# Patient Record
Sex: FEMALE | Race: WHITE | Employment: OTHER | URBAN - METROPOLITAN AREA
[De-identification: names, ages, dates, MRNs, and addresses within clinical notes are randomized per-mention and may not be internally consistent; named-entity substitution may affect disease eponyms.]

---

## 2017-02-15 PROCEDURE — 82570 ASSAY OF URINE CREATININE: CPT | Performed by: INTERNAL MEDICINE

## 2017-02-15 PROCEDURE — 82043 UR ALBUMIN QUANTITATIVE: CPT | Performed by: INTERNAL MEDICINE

## 2017-03-30 ENCOUNTER — APPOINTMENT (OUTPATIENT)
Dept: LAB | Age: 67
End: 2017-03-30
Attending: INTERNAL MEDICINE
Payer: COMMERCIAL

## 2017-03-30 DIAGNOSIS — D75.839 THROMBOCYTOSIS: ICD-10-CM

## 2017-03-30 DIAGNOSIS — R79.89 ABNORMAL CBC: ICD-10-CM

## 2017-03-30 PROCEDURE — 36415 COLL VENOUS BLD VENIPUNCTURE: CPT

## 2017-03-30 PROCEDURE — 81206 BCR/ABL1 GENE MAJOR BP: CPT

## 2017-03-30 PROCEDURE — 81270 JAK2 GENE: CPT

## 2017-04-19 ENCOUNTER — LABORATORY ENCOUNTER (OUTPATIENT)
Dept: LAB | Age: 67
End: 2017-04-19
Attending: INTERNAL MEDICINE
Payer: COMMERCIAL

## 2017-04-19 DIAGNOSIS — D75.839 THROMBOCYTOSIS: ICD-10-CM

## 2017-04-19 DIAGNOSIS — R79.89 ABNORMAL CBC: ICD-10-CM

## 2017-04-19 DIAGNOSIS — D47.1 MYELOPROLIFERATIVE DISEASE (HCC): ICD-10-CM

## 2017-04-19 PROCEDURE — 88342 IMHCHEM/IMCYTCHM 1ST ANTB: CPT

## 2017-04-19 PROCEDURE — 88305 TISSUE EXAM BY PATHOLOGIST: CPT

## 2017-04-19 PROCEDURE — 88311 DECALCIFY TISSUE: CPT

## 2017-04-19 PROCEDURE — 88271 CYTOGENETICS DNA PROBE: CPT

## 2017-04-19 PROCEDURE — 88275 CYTOGENETICS 100-300: CPT

## 2017-04-19 PROCEDURE — 88264 CHROMOSOME ANALYSIS 20-25: CPT

## 2017-04-19 PROCEDURE — 85097 BONE MARROW INTERPRETATION: CPT

## 2017-04-19 PROCEDURE — 88237 TISSUE CULTURE BONE MARROW: CPT

## 2017-04-19 PROCEDURE — 88313 SPECIAL STAINS GROUP 2: CPT

## 2017-05-03 PROCEDURE — 82668 ASSAY OF ERYTHROPOIETIN: CPT | Performed by: INTERNAL MEDICINE

## 2017-05-17 PROBLEM — D47.1 MYELOPROLIFERATIVE DISORDER (HCC): Status: ACTIVE | Noted: 2017-05-17

## 2017-05-31 PROBLEM — Z79.899 ENCOUNTER FOR MONITORING OF HYDROXYUREA THERAPY: Status: ACTIVE | Noted: 2017-05-31

## 2017-05-31 PROBLEM — Z51.81 ENCOUNTER FOR MONITORING OF HYDROXYUREA THERAPY: Status: ACTIVE | Noted: 2017-05-31

## 2017-07-14 PROBLEM — Z51.81 ENCOUNTER FOR MONITORING OF HYDROXYUREA THERAPY: Status: RESOLVED | Noted: 2017-05-31 | Resolved: 2017-07-14

## 2017-07-14 PROBLEM — Z79.899 ENCOUNTER FOR MONITORING OF HYDROXYUREA THERAPY: Status: RESOLVED | Noted: 2017-05-31 | Resolved: 2017-07-14

## 2017-07-14 PROCEDURE — 81001 URINALYSIS AUTO W/SCOPE: CPT | Performed by: INTERNAL MEDICINE

## 2017-07-14 PROCEDURE — 87086 URINE CULTURE/COLONY COUNT: CPT | Performed by: INTERNAL MEDICINE

## 2017-09-08 PROBLEM — K21.9 GASTROESOPHAGEAL REFLUX DISEASE, ESOPHAGITIS PRESENCE NOT SPECIFIED: Status: ACTIVE | Noted: 2017-09-08

## 2017-09-08 PROBLEM — K59.09 OTHER CONSTIPATION: Status: ACTIVE | Noted: 2017-09-08

## 2017-09-08 PROBLEM — R10.30 LOWER ABDOMINAL PAIN: Status: ACTIVE | Noted: 2017-09-08

## 2017-09-08 PROBLEM — R19.8 RECTAL PRESSURE: Status: ACTIVE | Noted: 2017-09-08

## 2017-12-12 PROCEDURE — 88305 TISSUE EXAM BY PATHOLOGIST: CPT | Performed by: SPECIALIST

## 2017-12-12 PROCEDURE — 88342 IMHCHEM/IMCYTCHM 1ST ANTB: CPT | Performed by: SPECIALIST

## 2018-01-02 RX ORDER — SAW/VIT E/SOD SEL/LYC/BETA/PYG 160-100
1 TABLET ORAL DAILY
COMMUNITY
End: 2018-05-07 | Stop reason: ALTCHOICE

## 2018-01-08 ENCOUNTER — ANESTHESIA EVENT (OUTPATIENT)
Dept: ENDOSCOPY | Facility: HOSPITAL | Age: 68
End: 2018-01-08
Payer: MEDICARE

## 2018-01-09 ENCOUNTER — HOSPITAL ENCOUNTER (OUTPATIENT)
Facility: HOSPITAL | Age: 68
Setting detail: HOSPITAL OUTPATIENT SURGERY
Discharge: HOME | End: 2018-01-09
Attending: INTERNAL MEDICINE | Admitting: INTERNAL MEDICINE
Payer: MEDICARE

## 2018-01-09 ENCOUNTER — ANESTHESIA (OUTPATIENT)
Dept: ENDOSCOPY | Facility: HOSPITAL | Age: 68
End: 2018-01-09
Payer: MEDICARE

## 2018-01-09 ENCOUNTER — SURGERY (OUTPATIENT)
Age: 68
End: 2018-01-09

## 2018-01-09 VITALS
DIASTOLIC BLOOD PRESSURE: 85 MMHG | RESPIRATION RATE: 46 BRPM | SYSTOLIC BLOOD PRESSURE: 139 MMHG | TEMPERATURE: 98 F | HEART RATE: 73 BPM | WEIGHT: 185 LBS | HEIGHT: 65 IN | BODY MASS INDEX: 30.82 KG/M2 | OXYGEN SATURATION: 100 %

## 2018-01-09 DIAGNOSIS — R59.1 LYMPHADENOPATHY: ICD-10-CM

## 2018-01-09 DIAGNOSIS — K62.89 RECTAL MASS: ICD-10-CM

## 2018-01-09 LAB — GLUCOSE BLD-MCNC: 93 MG/DL (ref 65–99)

## 2018-01-09 PROCEDURE — 82962 GLUCOSE BLOOD TEST: CPT

## 2018-01-09 PROCEDURE — 0DBP8ZX EXCISION OF RECTUM, VIA NATURAL OR ARTIFICIAL OPENING ENDOSCOPIC, DIAGNOSTIC: ICD-10-PCS | Performed by: INTERNAL MEDICINE

## 2018-01-09 PROCEDURE — 0DJD8ZZ INSPECTION OF LOWER INTESTINAL TRACT, VIA NATURAL OR ARTIFICIAL OPENING ENDOSCOPIC: ICD-10-PCS | Performed by: INTERNAL MEDICINE

## 2018-01-09 PROCEDURE — 88305 TISSUE EXAM BY PATHOLOGIST: CPT | Performed by: INTERNAL MEDICINE

## 2018-01-09 RX ORDER — SODIUM CHLORIDE, SODIUM LACTATE, POTASSIUM CHLORIDE, CALCIUM CHLORIDE 600; 310; 30; 20 MG/100ML; MG/100ML; MG/100ML; MG/100ML
INJECTION, SOLUTION INTRAVENOUS CONTINUOUS
Status: DISCONTINUED | OUTPATIENT
Start: 2018-01-09 | End: 2018-01-09

## 2018-01-09 RX ORDER — DEXTROSE MONOHYDRATE 25 G/50ML
50 INJECTION, SOLUTION INTRAVENOUS
Status: DISCONTINUED | OUTPATIENT
Start: 2018-01-09 | End: 2018-01-09

## 2018-01-09 RX ORDER — NALOXONE HYDROCHLORIDE 0.4 MG/ML
80 INJECTION, SOLUTION INTRAMUSCULAR; INTRAVENOUS; SUBCUTANEOUS AS NEEDED
Status: DISCONTINUED | OUTPATIENT
Start: 2018-01-09 | End: 2018-01-09

## 2018-01-09 RX ORDER — ONDANSETRON 2 MG/ML
4 INJECTION INTRAMUSCULAR; INTRAVENOUS AS NEEDED
Status: CANCELLED | OUTPATIENT
Start: 2018-01-09 | End: 2018-01-10

## 2018-01-09 RX ORDER — SODIUM CHLORIDE, SODIUM LACTATE, POTASSIUM CHLORIDE, CALCIUM CHLORIDE 600; 310; 30; 20 MG/100ML; MG/100ML; MG/100ML; MG/100ML
INJECTION, SOLUTION INTRAVENOUS CONTINUOUS
Status: CANCELLED | OUTPATIENT
Start: 2018-01-09

## 2018-01-09 RX ORDER — HYDROMORPHONE HYDROCHLORIDE 1 MG/ML
0.4 INJECTION, SOLUTION INTRAMUSCULAR; INTRAVENOUS; SUBCUTANEOUS EVERY 5 MIN PRN
Status: DISCONTINUED | OUTPATIENT
Start: 2018-01-09 | End: 2018-01-09

## 2018-01-09 RX ORDER — ONDANSETRON 2 MG/ML
4 INJECTION INTRAMUSCULAR; INTRAVENOUS AS NEEDED
Status: DISCONTINUED | OUTPATIENT
Start: 2018-01-09 | End: 2018-01-09

## 2018-01-09 RX ORDER — DEXTROSE MONOHYDRATE 25 G/50ML
50 INJECTION, SOLUTION INTRAVENOUS
Status: CANCELLED | OUTPATIENT
Start: 2018-01-09

## 2018-01-09 RX ORDER — HYDROMORPHONE HYDROCHLORIDE 1 MG/ML
0.4 INJECTION, SOLUTION INTRAMUSCULAR; INTRAVENOUS; SUBCUTANEOUS EVERY 5 MIN PRN
Status: CANCELLED | OUTPATIENT
Start: 2018-01-09 | End: 2018-01-10

## 2018-01-09 RX ORDER — NALOXONE HYDROCHLORIDE 0.4 MG/ML
80 INJECTION, SOLUTION INTRAMUSCULAR; INTRAVENOUS; SUBCUTANEOUS AS NEEDED
Status: CANCELLED | OUTPATIENT
Start: 2018-01-09 | End: 2018-01-10

## 2018-01-09 NOTE — ANESTHESIA POSTPROCEDURE EVALUATION
25 [facility] Patient Status:  Hospital Outpatient Surgery   Age/Gender 74year old female MRN [id number]   Location 118 [facility]. Attending Lexx Meza MD   Hosp Day # 0 PCP Dinesh Mehta, DO       Anesthesia Post-op Not

## 2018-01-09 NOTE — H&P
[facility] Department of  Gastroenterology  Update Health History :       Emmalene Hamper  female   Jose Nash MD     [id number]  6/23/1950 Primary Care Physician  Naheed Oakley DO        HPI :  Rectal mass, bx HGD in [place],but CT showed mas and Heart Disorder Father    • Hypertension Father    • Cancer Mother      breast   • Breast Cancer Mother 47   • Diabetes Paternal Grandmother    • Lipids Neg       Smoking status: Former Smoker                                                              Pac (ADVIL) 200 MG Oral Cap Take 4 capsules by mouth as needed. Disp:  Rfl:    aspirin (SB LOW DOSE ASA EC) 81 MG Oral Tab EC Take 81 mg by mouth daily. Disp:  Rfl:    Ascorbic Acid (JENNIFER-C OR) Take 500 mg by mouth daily.  1 daily  Disp:  Rfl:    Calcium 500 MG

## 2018-01-09 NOTE — ANESTHESIA PREPROCEDURE EVALUATION
PRE-OP EVALUATION    Patient Name: Geeta Peñaloza    Pre-op Diagnosis: Lymphadenopathy [R59.1]  Rectal mass [K62.9]    Procedure(s):  RECTAL ENDOSCOPIC ULTRASOUND, FLEXIBLE SIGMOIDOSCOPY WITH FINE NEEDLE ASPIRATION  _    Surgeon(s) and Role:     * Piter tolerance: good     MET: >4    (+) obesity  (+) hypertension   (+) hyperlipidemia                                  Endo/Other      (+) diabetes  type 2, using insulin  (+) hypothyroidism                       Pulmonary    Negative pulmonary ROS. pre-op labs reviewed.     Lab Results  Component Value Date   WBC 8.18 12/07/2017   RBC 3.86 12/07/2017   HGB 14.5 12/07/2017   HCT 41.7 12/07/2017   .0 (H) 12/07/2017   MCH 37.6 (H) 12/07/2017   MCHC 34.8 12/07/2017   RDW 13.0 12/07/2017    1

## 2018-01-10 NOTE — OPERATIVE REPORT
[facility]    PATIENT'S NAME: Rob Adventism   ATTENDING PHYSICIAN: Aaron Hall M.D. OPERATING PHYSICIAN: Aaron Hall M.D.    PATIENT ACCOUNT#:   [de-identified]    LOCATION:  [address]  MEDICAL RECORD #:   [id number]       SWATI removed. Next, the radial electronic echoendoscope was introduced under visualization in the rectum. The mass itself was visualized and appeared to be hypoechoic invading into the serosa, making this a T3 lesion.   There were at least 3 lymph nodes noted,

## 2018-01-12 PROBLEM — C20 RECTAL CANCER (HCC): Status: ACTIVE | Noted: 2018-01-12

## 2018-01-23 PROCEDURE — 82378 CARCINOEMBRYONIC ANTIGEN: CPT | Performed by: INTERNAL MEDICINE

## 2018-03-13 PROCEDURE — 82272 OCCULT BLD FECES 1-3 TESTS: CPT | Performed by: RADIOLOGY

## 2018-04-06 PROBLEM — R19.8 RECTAL PRESSURE: Status: RESOLVED | Noted: 2017-09-08 | Resolved: 2018-04-06

## 2018-04-06 PROBLEM — R10.30 LOWER ABDOMINAL PAIN: Status: RESOLVED | Noted: 2017-09-08 | Resolved: 2018-04-06

## 2018-05-05 PROBLEM — I70.8 AORTO-ILIAC ATHEROSCLEROSIS (HCC): Status: ACTIVE | Noted: 2018-05-05

## 2018-05-05 PROBLEM — I77.1 TORTUOUS AORTA (HCC): Status: ACTIVE | Noted: 2018-05-05

## 2018-05-05 PROBLEM — J84.9 INTERSTITIAL LUNG DISEASE (HCC): Status: ACTIVE | Noted: 2018-05-05

## 2018-05-05 PROBLEM — J43.2 CENTRILOBULAR EMPHYSEMA (HCC): Status: ACTIVE | Noted: 2018-05-05

## 2018-05-05 PROBLEM — I70.0 AORTO-ILIAC ATHEROSCLEROSIS (HCC): Status: ACTIVE | Noted: 2018-05-05

## 2018-05-05 PROBLEM — I70.0 AORTIC ATHEROSCLEROSIS (HCC): Status: ACTIVE | Noted: 2018-05-05

## 2018-07-16 PROBLEM — N28.9 RENAL INSUFFICIENCY: Status: ACTIVE | Noted: 2018-07-16

## 2018-07-16 PROBLEM — D64.81 ANEMIA ASSOCIATED WITH CHEMOTHERAPY: Status: ACTIVE | Noted: 2018-07-16

## 2018-07-16 PROBLEM — T45.1X5A ANEMIA ASSOCIATED WITH CHEMOTHERAPY: Status: ACTIVE | Noted: 2018-07-16

## 2018-07-30 PROCEDURE — 82607 VITAMIN B-12: CPT | Performed by: PHYSICIAN ASSISTANT

## 2018-08-27 PROCEDURE — 87493 C DIFF AMPLIFIED PROBE: CPT | Performed by: NURSE PRACTITIONER

## 2018-09-04 PROBLEM — N18.30 CKD (CHRONIC KIDNEY DISEASE) STAGE 3, GFR 30-59 ML/MIN (HCC): Status: ACTIVE | Noted: 2018-09-04

## 2018-10-17 PROBLEM — E83.42 HYPOMAGNESEMIA: Status: ACTIVE | Noted: 2018-10-17

## 2018-11-05 PROBLEM — I50.9 HEART FAILURE, UNSPECIFIED HF CHRONICITY, UNSPECIFIED HEART FAILURE TYPE (HCC): Status: ACTIVE | Noted: 2018-11-05

## 2018-11-05 PROBLEM — I50.9 HEART FAILURE, UNSPECIFIED HF CHRONICITY, UNSPECIFIED HEART FAILURE TYPE (HCC): Status: RESOLVED | Noted: 2018-11-05 | Resolved: 2018-11-05

## 2018-11-14 PROBLEM — R91.8 LUNG NODULE, MULTIPLE: Status: ACTIVE | Noted: 2018-11-14

## 2019-01-03 PROBLEM — Z93.3 COLOSTOMY IN PLACE (HCC): Status: ACTIVE | Noted: 2019-01-03

## 2019-01-03 PROBLEM — D70.1 CHEMOTHERAPY-INDUCED NEUTROPENIA (HCC): Status: ACTIVE | Noted: 2019-01-03

## 2019-01-03 PROBLEM — T45.1X5A CHEMOTHERAPY-INDUCED NEUTROPENIA (HCC): Status: ACTIVE | Noted: 2019-01-03

## 2019-01-29 ENCOUNTER — HOSPITAL ENCOUNTER (INPATIENT)
Facility: HOSPITAL | Age: 69
LOS: 9 days | Discharge: HOME | DRG: 074 | End: 2019-02-07
Attending: INTERNAL MEDICINE | Admitting: INTERNAL MEDICINE
Payer: MEDICARE

## 2019-01-29 DIAGNOSIS — R11.0 NAUSEA: ICD-10-CM

## 2019-01-29 PROBLEM — N17.9 AKI (ACUTE KIDNEY INJURY) (HCC): Status: ACTIVE | Noted: 2019-01-29

## 2019-01-29 LAB
ANION GAP SERPL CALC-SCNC: 12 MMOL/L (ref 0–18)
BASOPHILS # BLD AUTO: 0.04 X10(3) UL (ref 0–0.2)
BASOPHILS # BLD AUTO: 0.05 X10(3) UL (ref 0–0.2)
BASOPHILS NFR BLD AUTO: 0.4 %
BASOPHILS NFR BLD AUTO: 0.4 %
BILIRUB UR QL STRIP.AUTO: NEGATIVE
BUN BLD-MCNC: 55 MG/DL (ref 8–20)
BUN/CREAT SERPL: 25 (ref 10–20)
CALCIUM BLD-MCNC: 9.5 MG/DL (ref 8.3–10.3)
CHLORIDE SERPL-SCNC: 99 MMOL/L (ref 101–111)
CO2 SERPL-SCNC: 19 MMOL/L (ref 22–32)
COLOR UR AUTO: YELLOW
CREAT BLD-MCNC: 2.2 MG/DL (ref 0.55–1.02)
CREAT UR-SCNC: 83.3 MG/DL
DEPRECATED RDW RBC AUTO: 49.7 FL (ref 35.1–46.3)
DEPRECATED RDW RBC AUTO: 50.6 FL (ref 35.1–46.3)
EOSINOPHIL # BLD AUTO: 0.12 X10(3) UL (ref 0–0.7)
EOSINOPHIL # BLD AUTO: 0.12 X10(3) UL (ref 0–0.7)
EOSINOPHIL NFR BLD AUTO: 1.1 %
EOSINOPHIL NFR BLD AUTO: 1.1 %
ERYTHROCYTE [DISTWIDTH] IN BLOOD BY AUTOMATED COUNT: 15.9 % (ref 11–15)
ERYTHROCYTE [DISTWIDTH] IN BLOOD BY AUTOMATED COUNT: 16 % (ref 11–15)
GLUCOSE BLD-MCNC: 164 MG/DL (ref 65–99)
GLUCOSE BLD-MCNC: 169 MG/DL (ref 65–99)
GLUCOSE BLD-MCNC: 219 MG/DL (ref 70–99)
GLUCOSE UR STRIP.AUTO-MCNC: >=500 MG/DL
HCT VFR BLD AUTO: 34.8 % (ref 35–48)
HCT VFR BLD AUTO: 34.9 % (ref 35–48)
HGB BLD-MCNC: 11.6 G/DL (ref 12–16)
HGB BLD-MCNC: 11.7 G/DL (ref 12–16)
IMM GRANULOCYTES # BLD AUTO: 0.12 X10(3) UL (ref 0–1)
IMM GRANULOCYTES # BLD AUTO: 0.15 X10(3) UL (ref 0–1)
IMM GRANULOCYTES NFR BLD: 1.1 %
IMM GRANULOCYTES NFR BLD: 1.3 %
K URINE RANDOM: 55.9 MMOL/L
KETONES UR STRIP.AUTO-MCNC: NEGATIVE MG/DL
LACTIC ACID: 1.2 MMOL/L (ref 0.5–2)
LEUKOCYTE ESTERASE UR QL STRIP.AUTO: NEGATIVE
LYMPHOCYTES # BLD AUTO: 0.52 X10(3) UL (ref 1–4)
LYMPHOCYTES # BLD AUTO: 0.62 X10(3) UL (ref 1–4)
LYMPHOCYTES NFR BLD AUTO: 4.6 %
LYMPHOCYTES NFR BLD AUTO: 5.6 %
MCH RBC QN AUTO: 28.8 PG (ref 26–34)
MCH RBC QN AUTO: 28.9 PG (ref 26–34)
MCHC RBC AUTO-ENTMCNC: 33.2 G/DL (ref 31–37)
MCHC RBC AUTO-ENTMCNC: 33.6 G/DL (ref 31–37)
MCV RBC AUTO: 85.7 FL (ref 80–100)
MCV RBC AUTO: 86.8 FL (ref 80–100)
MONOCYTES # BLD AUTO: 0.91 X10(3) UL (ref 0.1–1)
MONOCYTES # BLD AUTO: 1.17 X10(3) UL (ref 0.1–1)
MONOCYTES NFR BLD AUTO: 10.3 %
MONOCYTES NFR BLD AUTO: 8.2 %
NEUTROPHILS # BLD AUTO: 9.3 X10 (3) UL (ref 1.5–7.7)
NEUTROPHILS # BLD AUTO: 9.3 X10(3) UL (ref 1.5–7.7)
NEUTROPHILS # BLD AUTO: 9.39 X10 (3) UL (ref 1.5–7.7)
NEUTROPHILS # BLD AUTO: 9.39 X10(3) UL (ref 1.5–7.7)
NEUTROPHILS NFR BLD AUTO: 82.5 %
NEUTROPHILS NFR BLD AUTO: 83.4 %
NITRITE UR QL STRIP.AUTO: NEGATIVE
OSMOLALITY SERPL CALC.SUM OF ELEC: 292 MOSM/KG (ref 275–295)
OSMOLALITY URINE: 628 MOSM/KG (ref 300–1300)
PH UR STRIP.AUTO: 6 [PH] (ref 4.5–8)
PLATELET # BLD AUTO: 361 10(3)UL (ref 150–450)
PLATELET # BLD AUTO: 392 10(3)UL (ref 150–450)
POTASSIUM SERPL-SCNC: 4.5 MMOL/L (ref 3.6–5.1)
PROT UR STRIP.AUTO-MCNC: 30 MG/DL
RBC # BLD AUTO: 4.02 X10(6)UL (ref 3.8–5.3)
RBC # BLD AUTO: 4.06 X10(6)UL (ref 3.8–5.3)
SODIUM SERPL-SCNC: 10 MMOL/L
SODIUM SERPL-SCNC: 130 MMOL/L (ref 136–144)
SP GR UR STRIP.AUTO: 1.02 (ref 1–1.03)
UROBILINOGEN UR STRIP.AUTO-MCNC: <2 MG/DL
UUN UR-MCNC: 867 MG/DL
WBC # BLD AUTO: 11.1 X10(3) UL (ref 4–11)
WBC # BLD AUTO: 11.4 X10(3) UL (ref 4–11)

## 2019-01-29 PROCEDURE — 80048 BASIC METABOLIC PNL TOTAL CA: CPT | Performed by: INTERNAL MEDICINE

## 2019-01-29 PROCEDURE — 82962 GLUCOSE BLOOD TEST: CPT

## 2019-01-29 PROCEDURE — 85025 COMPLETE CBC W/AUTO DIFF WBC: CPT | Performed by: INTERNAL MEDICINE

## 2019-01-29 PROCEDURE — 84300 ASSAY OF URINE SODIUM: CPT | Performed by: INTERNAL MEDICINE

## 2019-01-29 PROCEDURE — 81001 URINALYSIS AUTO W/SCOPE: CPT | Performed by: INTERNAL MEDICINE

## 2019-01-29 PROCEDURE — 83605 ASSAY OF LACTIC ACID: CPT | Performed by: INTERNAL MEDICINE

## 2019-01-29 PROCEDURE — 84540 ASSAY OF URINE/UREA-N: CPT | Performed by: INTERNAL MEDICINE

## 2019-01-29 PROCEDURE — 82570 ASSAY OF URINE CREATININE: CPT | Performed by: INTERNAL MEDICINE

## 2019-01-29 PROCEDURE — 84133 ASSAY OF URINE POTASSIUM: CPT | Performed by: INTERNAL MEDICINE

## 2019-01-29 PROCEDURE — 83935 ASSAY OF URINE OSMOLALITY: CPT | Performed by: INTERNAL MEDICINE

## 2019-01-29 RX ORDER — DULOXETIN HYDROCHLORIDE 30 MG/1
30 CAPSULE, DELAYED RELEASE ORAL DAILY
Status: DISCONTINUED | OUTPATIENT
Start: 2019-01-30 | End: 2019-02-07

## 2019-01-29 RX ORDER — HYDROCODONE BITARTRATE AND ACETAMINOPHEN 5; 325 MG/1; MG/1
1 TABLET ORAL EVERY 4 HOURS PRN
Status: DISCONTINUED | OUTPATIENT
Start: 2019-01-29 | End: 2019-02-07

## 2019-01-29 RX ORDER — HEPARIN SODIUM 5000 [USP'U]/ML
5000 INJECTION, SOLUTION INTRAVENOUS; SUBCUTANEOUS EVERY 8 HOURS SCHEDULED
Status: DISCONTINUED | OUTPATIENT
Start: 2019-01-29 | End: 2019-02-02

## 2019-01-29 RX ORDER — ONDANSETRON 2 MG/ML
4 INJECTION INTRAMUSCULAR; INTRAVENOUS EVERY 6 HOURS PRN
Status: DISCONTINUED | OUTPATIENT
Start: 2019-01-29 | End: 2019-01-30

## 2019-01-29 RX ORDER — HYDROCODONE BITARTRATE AND ACETAMINOPHEN 5; 325 MG/1; MG/1
2 TABLET ORAL EVERY 4 HOURS PRN
Status: DISCONTINUED | OUTPATIENT
Start: 2019-01-29 | End: 2019-02-07

## 2019-01-29 RX ORDER — PANTOPRAZOLE SODIUM 40 MG/1
40 TABLET, DELAYED RELEASE ORAL
Status: DISCONTINUED | OUTPATIENT
Start: 2019-01-30 | End: 2019-02-07

## 2019-01-29 RX ORDER — METOCLOPRAMIDE HYDROCHLORIDE 5 MG/ML
5 INJECTION INTRAMUSCULAR; INTRAVENOUS EVERY 8 HOURS PRN
Status: DISCONTINUED | OUTPATIENT
Start: 2019-01-29 | End: 2019-01-30

## 2019-01-29 RX ORDER — DIAZEPAM 2 MG/1
2 TABLET ORAL EVERY 12 HOURS PRN
Status: DISCONTINUED | OUTPATIENT
Start: 2019-01-29 | End: 2019-02-07

## 2019-01-29 RX ORDER — ATORVASTATIN CALCIUM 10 MG/1
10 TABLET, FILM COATED ORAL NIGHTLY
Status: DISCONTINUED | OUTPATIENT
Start: 2019-01-29 | End: 2019-02-07

## 2019-01-29 RX ORDER — SODIUM CHLORIDE 9 MG/ML
INJECTION, SOLUTION INTRAVENOUS CONTINUOUS
Status: DISCONTINUED | OUTPATIENT
Start: 2019-01-29 | End: 2019-01-29

## 2019-01-29 RX ORDER — ACETAMINOPHEN 325 MG/1
650 TABLET ORAL EVERY 4 HOURS PRN
Status: DISCONTINUED | OUTPATIENT
Start: 2019-01-29 | End: 2019-02-07

## 2019-01-29 RX ORDER — DEXTROSE MONOHYDRATE 25 G/50ML
50 INJECTION, SOLUTION INTRAVENOUS
Status: DISCONTINUED | OUTPATIENT
Start: 2019-01-29 | End: 2019-02-07

## 2019-01-29 NOTE — CONSULTS
[facility]    Report of Consultation    Jami Albrecht Patient Status:  Inpatient    1950 MRN [id number]   [facility] 4NW-A Attending Suze Cesar MD   Hosp Day # 0 PCP Monica Morales DO     Date of consult: 2019    [facility]   • COLONOSCOPY, POSSIBLE BIOPSY, POSSIBLE POLYPECTOMY 03251 N/A 11/11/2013    Performed by Manning Kocher, MD at [address] [facility] (EUS) N/A 1/9/2018    Performed by Miley Mejia MD at [address] [place] [address] 30 g, Oral, Q15 Min PRN **OR** Glucose-Vitamin C (DEX-4) 4-6 GM-MG chewable tab 8 tablet, 8 tablet, Oral, Q15 Min PRN  •  acetaminophen (TYLENOL) tab 650 mg, 650 mg, Oral, Q4H PRN **OR** HYDROcodone-acetaminophen (NORCO) 5-325 MG per tab 1 tablet, 1 tablet donnie    LABORATORY DATA:       Lab Results   Component Value Date     (H) 01/28/2019    BUN 53 (H) 01/28/2019    CREATSERUM 2.22 (H) 01/28/2019    CA 9.5 01/23/2018    ALKPHO 139 01/28/2019    AST 12 (L) 01/28/2019    ALT 16 01/28/2019    BILT 0.4 HL, CKD, who presented from oncology office for abnormal labs    SILVIA on CKD3  -- likely prerenal due to poor po intake + vomiting + ARB + alleve  -- check UA, urine lytes, orthostatics, PVR, renal US  -- continue IVFs  -- hold ARB.  Avoid nephrotoxins and r

## 2019-01-29 NOTE — PROGRESS NOTES
Critical access hospital Pharmacy Note:  Renal Dose Adjustment for Metoclopramide (REGLAN)    Porsha Albrecht has been prescribed Metoclopramide (REGLAN) 10 mg every 8 hours as needed. Estimated Creatinine Clearance: 22.4 mL/min (A) (based on SCr of 2.22 mg/dL (H)).     Her ca

## 2019-01-29 NOTE — H&P
[facility] Hospitalist History and Physical      No chief complaint on file.        PCP: Brannon Lee DO      History of Present Illness: Patient is a 79year old female with PMH sig for Gerd, DM2, rectal cancer s/p ileostomy and chemo finished 10/18, HTN, HL, h Casandra Chowdary MD at [facility]   • ENDOSCOPIC ULTRASOUND (EUS) N/A 1/9/2018    Performed by Lennox Riggers, MD at [facility] ENDOSCOPY   • ESOPHAGOGASTRODUODENOSCOPY, COLONOSCOPY, POSSIBLE BIOPSY, POSSIBLE POLYPECTOMY [address] [facility], [address] N/A 12/12/2017    Pe dysuria, frequency or urgency. MUSCULOSKELETAL:  No arthritis  SKIN:  No change in skin, hair or nails. NEUROLOGIC:  No paresthesias, weakness, or numbness. PSYCHIATRIC:  No disorder of thought or mood.   ENDOCRINE:  No heat or cold intolerance, polyuria oophorectomy. COMPARISON STUDY: CT abdomen pelvis dated 11/8/2018, 4/2/2018. 12/26/2017. 5/19/2017. 5/18/2017. 10/6/2015. . IMAGING PROTOCOL TECHNIQUE: Intermittent 4 pulse fluoroscopy was used in conjunction with static radiographs, minimizing the overall appears to be within normal limits. Vertebral body heights appear reasonably well maintained. No definite acute fracture or subluxation is seen. The pedicles are visualized on the frontal projection and appear grossly unremarkable.  Decreased disc height, e Department of Radiology will inform the patient of their results by letter and will contact the patient for needed follow-up studies.  ---------------------------------------------------------------------------------------- ---------------------------------

## 2019-01-30 ENCOUNTER — ANESTHESIA EVENT (OUTPATIENT)
Dept: ENDOSCOPY | Facility: HOSPITAL | Age: 69
DRG: 074 | End: 2019-01-30
Payer: MEDICARE

## 2019-01-30 LAB
ALBUMIN SERPL-MCNC: 2.8 G/DL (ref 3.1–4.5)
ANION GAP SERPL CALC-SCNC: 9 MMOL/L (ref 0–18)
BASOPHILS # BLD AUTO: 0.03 X10(3) UL (ref 0–0.2)
BASOPHILS NFR BLD AUTO: 0.3 %
BUN BLD-MCNC: 45 MG/DL (ref 8–20)
BUN/CREAT SERPL: 22.2 (ref 10–20)
CALCIUM BLD-MCNC: 9.1 MG/DL (ref 8.3–10.3)
CHLORIDE SERPL-SCNC: 99 MMOL/L (ref 101–111)
CO2 SERPL-SCNC: 28 MMOL/L (ref 22–32)
CREAT BLD-MCNC: 2.03 MG/DL (ref 0.55–1.02)
DEPRECATED RDW RBC AUTO: 49.7 FL (ref 35.1–46.3)
EOSINOPHIL # BLD AUTO: 0.13 X10(3) UL (ref 0–0.7)
EOSINOPHIL NFR BLD AUTO: 1.4 %
ERYTHROCYTE [DISTWIDTH] IN BLOOD BY AUTOMATED COUNT: 15.9 % (ref 11–15)
GLUCOSE BLD-MCNC: 104 MG/DL (ref 65–99)
GLUCOSE BLD-MCNC: 104 MG/DL (ref 70–99)
GLUCOSE BLD-MCNC: 117 MG/DL (ref 65–99)
GLUCOSE BLD-MCNC: 144 MG/DL (ref 65–99)
GLUCOSE BLD-MCNC: 151 MG/DL (ref 65–99)
HAV IGM SER QL: 2.2 MG/DL (ref 1.8–2.5)
HCT VFR BLD AUTO: 33.4 % (ref 35–48)
HGB BLD-MCNC: 11 G/DL (ref 12–16)
IMM GRANULOCYTES # BLD AUTO: 0.14 X10(3) UL (ref 0–1)
IMM GRANULOCYTES NFR BLD: 1.5 %
LYMPHOCYTES # BLD AUTO: 0.43 X10(3) UL (ref 1–4)
LYMPHOCYTES NFR BLD AUTO: 4.6 %
MCH RBC QN AUTO: 28.3 PG (ref 26–34)
MCHC RBC AUTO-ENTMCNC: 32.9 G/DL (ref 31–37)
MCV RBC AUTO: 85.9 FL (ref 80–100)
MONOCYTES # BLD AUTO: 0.95 X10(3) UL (ref 0.1–1)
MONOCYTES NFR BLD AUTO: 10.1 %
NEUTROPHILS # BLD AUTO: 7.69 X10 (3) UL (ref 1.5–7.7)
NEUTROPHILS # BLD AUTO: 7.69 X10(3) UL (ref 1.5–7.7)
NEUTROPHILS NFR BLD AUTO: 82.1 %
OSMOLALITY SERPL CALC.SUM OF ELEC: 294 MOSM/KG (ref 275–295)
PHOSPHATE SERPL-MCNC: 4.5 MG/DL (ref 2.5–4.9)
PLATELET # BLD AUTO: 342 10(3)UL (ref 150–450)
POTASSIUM SERPL-SCNC: 3.7 MMOL/L (ref 3.6–5.1)
RBC # BLD AUTO: 3.89 X10(6)UL (ref 3.8–5.3)
SODIUM SERPL-SCNC: 136 MMOL/L (ref 136–144)
WBC # BLD AUTO: 9.4 X10(3) UL (ref 4–11)

## 2019-01-30 PROCEDURE — 85025 COMPLETE CBC W/AUTO DIFF WBC: CPT | Performed by: INTERNAL MEDICINE

## 2019-01-30 PROCEDURE — 83735 ASSAY OF MAGNESIUM: CPT | Performed by: INTERNAL MEDICINE

## 2019-01-30 PROCEDURE — 80069 RENAL FUNCTION PANEL: CPT | Performed by: INTERNAL MEDICINE

## 2019-01-30 PROCEDURE — 82962 GLUCOSE BLOOD TEST: CPT

## 2019-01-30 RX ORDER — PROMETHAZINE HYDROCHLORIDE 6.25 MG/5ML
10 SYRUP ORAL 3 TIMES DAILY
Status: DISCONTINUED | OUTPATIENT
Start: 2019-01-30 | End: 2019-01-30

## 2019-01-30 RX ORDER — POTASSIUM CHLORIDE 14.9 MG/ML
20 INJECTION INTRAVENOUS ONCE
Status: DISCONTINUED | OUTPATIENT
Start: 2019-01-30 | End: 2019-01-30

## 2019-01-30 RX ORDER — SODIUM CHLORIDE, SODIUM LACTATE, POTASSIUM CHLORIDE, CALCIUM CHLORIDE 600; 310; 30; 20 MG/100ML; MG/100ML; MG/100ML; MG/100ML
INJECTION, SOLUTION INTRAVENOUS CONTINUOUS
Status: DISCONTINUED | OUTPATIENT
Start: 2019-01-30 | End: 2019-02-01

## 2019-01-30 RX ORDER — METOCLOPRAMIDE HYDROCHLORIDE 5 MG/ML
5 INJECTION INTRAMUSCULAR; INTRAVENOUS EVERY 6 HOURS
Status: DISCONTINUED | OUTPATIENT
Start: 2019-01-30 | End: 2019-01-30

## 2019-01-30 RX ORDER — ONDANSETRON 2 MG/ML
4 INJECTION INTRAMUSCULAR; INTRAVENOUS EVERY 6 HOURS
Status: DISCONTINUED | OUTPATIENT
Start: 2019-01-30 | End: 2019-02-01

## 2019-01-30 RX ORDER — METOCLOPRAMIDE HYDROCHLORIDE 5 MG/ML
5 INJECTION INTRAMUSCULAR; INTRAVENOUS EVERY 6 HOURS
Status: DISCONTINUED | OUTPATIENT
Start: 2019-01-30 | End: 2019-02-02

## 2019-01-30 NOTE — PROGRESS NOTES
[facility]    Nephrology Progress Note    Philly Duty Roche Attending:  Saurabh Alanis DO     Cc: SILVIA    SUBJECTIVE     Feeling a little better  No sob  No other complaints    PHYSICAL EXAM   Vital signs: /73 (BP Location: Left arm)   Pulse 86 (Porcine) 5000 UNIT/ML injection 5,000 Units 5,000 Units Subcutaneous Q8H [facility]   diazepam (VALIUM) tab 2 mg 2 mg Oral Q12H PRN   DULoxetine HCl (CYMBALTA) DR particles cap 30 mg 30 mg Oral Daily   Pantoprazole Sodium (PROTONIX) EC tab 40 mg 40 mg Oral QAM A

## 2019-01-30 NOTE — CONSULTS
[facility]    Report of Consultation    Dontrell Albrecht Patient Status:  Inpatient    1950 MRN [id number]   [facility] 4NW-A Attending Damir Sinha DO   Hosp Day # 1 PCP Diana Davis DO     Date of Admission:  2019  Da ESOPHAGOGASTRODUODENOSCOPY, COLONOSCOPY, POSSIBLE BIOPSY, POSSIBLE POLYPECTOMY 15876, [address] N/A 12/12/2017    Performed by Cordelia Warren MD at [facility]   • [facility] 1/9/2018    Performed by Hector Garcia MD at [facility] ENDO 30 g, 30 g, Oral, Q15 Min PRN **OR** Glucose-Vitamin C (DEX-4) 4-6 GM-MG chewable tab 8 tablet, 8 tablet, Oral, Q15 Min PRN  •  acetaminophen (TYLENOL) tab 650 mg, 650 mg, Oral, Q4H PRN **OR** HYDROcodone-acetaminophen (NORCO) 5-325 MG per tab 1 tablet, 1 Denies shortness of breath, chronic/frequent hoarseness, wheezing, exposure to tuberculosis, chronic cough, spitting up blood, cough up sputum, sleep apnea.   Genitourinary: Denies kidney stones, painful/difficult urination, frequent urinary infections, nga Component Value Date    WBC 9.4 01/30/2019    HGB 11.0 01/30/2019    HCT 33.4 01/30/2019    .0 01/30/2019    CREATSERUM 2.03 01/30/2019    BUN 45 01/30/2019     01/30/2019    K 3.7 01/30/2019    CL 99 01/30/2019    CO2 28.0 01/30/2019    GLU

## 2019-01-30 NOTE — CONSULTS
[facility]                       Gastroenterology [address] [facility] Gastroenterology    Sary Albrecht Patient Status:  Inpatient    1950 MRN [id number]   [facility] 4NW-A Attending Chadwick Singh, [address] [facility] Day # 1 ESTELA Farfan she ate a ham sandwhich with carrots this afternoon and reports significant nausea at this time   PMHx:   Past Medical History:   Diagnosis Date   • Blood disorder     Myeloproliferative disease diagnosed in 4/2017   [facility]   • Diabetes • OTHER SURGICAL HISTORY      Bilateral oophorectomy at time of colectomy   • PART REMOVAL COLON W ANASTOMOSIS  2003    by Dr Lavern Edmonds at [facility]   • REMOVAL OF OVARY/TUBE(S)  2003    at same time as colectomy; at [facility]     Medications:   lactated ringers malignancies; No family history of ulcer disease, or inflammatory bowel disease  ROS:  In addition to the pertinent positives described above:             Infectious Disease:  No chronic infections or recent fevers prior to the acute illness            Car dry  Psychiatric: Appropriate mood and congruent affect without obvious depression or anxiety  Labs: Lab Results   Component Value Date    WBC 9.4 01/30/2019    HGB 11.0 01/30/2019    HCT 33.4 01/30/2019    .0 01/30/2019    CREATSERUM 2.03 01/30/201 11/8/2018  _________________________________________  DATE OF SERVICE: 12.10.2018  CLINICAL HISTORY: Loculated pelvic fluid collection status post percutaneous drainage on 12/10/2018  TECHNIQUE: Transgluteal ultrasound imaging of the loculated pelvic fluid chest, abdomen and pelvis dated 4/2/2018  TECHNIQUE:  Routine helical scanning was performed through the chest, abdomen and pelvis with  contrast.   Automated exposure control and ALARA manual techniques for patient specific dose reduction were  followed w identified. Postoperative changes from  low anterior resection with diverting colostomy in the right lower abdomen are noted. There is a  presacral fluid collection, superior to the colorectal anastomosis, measuring 7.0 x 5.0 x 7.2 cm  (199/4).   GI TRACT: HTN, dyslipidemia, DM, CKD, GERD, and rectal cancer s/p ileostomy and chemo/radition (completed 10/2018) admitted yesterday with intractable nausea and is requesting a 2nd GI opinion.  Symptoms have continued despite completing chemo and have actually worse

## 2019-01-30 NOTE — ANESTHESIA PREPROCEDURE EVALUATION
PRE-OP EVALUATION    Patient Name: Mimi Aponte    Pre-op Diagnosis: Nausea [R11.0]    Procedure(s):  ESOPHAGOGASTRODUODENOSCOPY (EGD)    Surgeon(s) and Role:     Damon Snowden MD - Primary    Pre-op vitals reviewed.   Temp: 98.2 °F (36.8 °C)  Pulse Potassium 25 MG Oral Tab 1  Tab daily. Disp: 90 tablet Rfl: 3       Allergies: Ace Inhibitors; Bees; Sulfa Antibiotics      Anesthesia Evaluation    Patient summary reviewed.     Anesthetic Complications  (-) history of anesthetic complications         GI/H COLONOSCOPY, POSSIBLE BIOPSY, POSSIBLE POLYPECTOMY [address]265 N/A 12/28/2018    Performed by Benjamin Stevens MD at [place] [address]., POSSIBLE POLYPECTOMY [address]296 N/A 11/11/2013    Performed by Jez Maynard MD at [facility] 15.9 (H) 01/30/2019    RDW 16.4 (H) 01/28/2019    .0 01/30/2019     01/28/2019     Lab Results   Component Value Date     01/30/2019     (L) 01/28/2019    K 3.7 01/30/2019    K 5.20 (H) 01/28/2019    CL 99 (L) 01/30/2019    CL 96

## 2019-01-30 NOTE — PLAN OF CARE
Admitted this 67y/o female with complaints of nausea & vomiting for 2months now,also complaints of poor appetite & generalized weakness. History of rectal cancer,has ileostomy on the RLQ,last chemo in October 2018. Also c/o chronic diarrhea,but stools has be

## 2019-01-30 NOTE — PROGRESS NOTES
[facility] Hospitalist Progress Note                                                                   [address] [facility]  6/23/1950    SUBJECTIVE: pt doing better still has nausea but tolerated breakfa 0858     PRN: glucose **OR** Glucose-Vitamin C **OR** dextrose **OR** glucose **OR** Glucose-Vitamin C, acetaminophen **OR** HYDROcodone-acetaminophen **OR** HYDROcodone-acetaminophen, diazepam, Heparin Lock Flush    Assessment/Plan:  Active Problems:    A

## 2019-01-31 ENCOUNTER — APPOINTMENT (OUTPATIENT)
Dept: NUCLEAR MEDICINE | Facility: HOSPITAL | Age: 69
DRG: 074 | End: 2019-01-31
Attending: INTERNAL MEDICINE
Payer: MEDICARE

## 2019-01-31 ENCOUNTER — ANESTHESIA (OUTPATIENT)
Dept: ENDOSCOPY | Facility: HOSPITAL | Age: 69
DRG: 074 | End: 2019-01-31
Payer: MEDICARE

## 2019-01-31 LAB
ALBUMIN SERPL-MCNC: 2.5 G/DL (ref 3.1–4.5)
ANION GAP SERPL CALC-SCNC: 8 MMOL/L (ref 0–18)
BUN BLD-MCNC: 28 MG/DL (ref 8–20)
BUN/CREAT SERPL: 15.3 (ref 10–20)
CALCIUM BLD-MCNC: 8.8 MG/DL (ref 8.3–10.3)
CHLORIDE SERPL-SCNC: 101 MMOL/L (ref 101–111)
CO2 SERPL-SCNC: 28 MMOL/L (ref 22–32)
CREAT BLD-MCNC: 1.83 MG/DL (ref 0.55–1.02)
CREAT UR-SCNC: 111 MG/DL
GLUCOSE BLD-MCNC: 127 MG/DL (ref 70–99)
GLUCOSE BLD-MCNC: 131 MG/DL (ref 65–99)
GLUCOSE BLD-MCNC: 185 MG/DL (ref 65–99)
GLUCOSE BLD-MCNC: 218 MG/DL (ref 65–99)
GLUCOSE BLD-MCNC: 222 MG/DL (ref 65–99)
HAV IGM SER QL: 2.2 MG/DL (ref 1.8–2.5)
OSMOLALITY SERPL CALC.SUM OF ELEC: 291 MOSM/KG (ref 275–295)
PHOSPHATE SERPL-MCNC: 3.7 MG/DL (ref 2.5–4.9)
POTASSIUM SERPL-SCNC: 3.9 MMOL/L (ref 3.6–5.1)
POTASSIUM SERPL-SCNC: 3.9 MMOL/L (ref 3.6–5.1)
SODIUM SERPL-SCNC: 137 MMOL/L (ref 136–144)
SODIUM SERPL-SCNC: 42 MMOL/L

## 2019-01-31 PROCEDURE — 80069 RENAL FUNCTION PANEL: CPT | Performed by: INTERNAL MEDICINE

## 2019-01-31 PROCEDURE — 0DB58ZX EXCISION OF ESOPHAGUS, VIA NATURAL OR ARTIFICIAL OPENING ENDOSCOPIC, DIAGNOSTIC: ICD-10-PCS | Performed by: INTERNAL MEDICINE

## 2019-01-31 PROCEDURE — 0DB78ZX EXCISION OF STOMACH, PYLORUS, VIA NATURAL OR ARTIFICIAL OPENING ENDOSCOPIC, DIAGNOSTIC: ICD-10-PCS | Performed by: INTERNAL MEDICINE

## 2019-01-31 PROCEDURE — 82962 GLUCOSE BLOOD TEST: CPT

## 2019-01-31 PROCEDURE — 83735 ASSAY OF MAGNESIUM: CPT | Performed by: INTERNAL MEDICINE

## 2019-01-31 PROCEDURE — 78264 GASTRIC EMPTYING IMG STUDY: CPT | Performed by: INTERNAL MEDICINE

## 2019-01-31 PROCEDURE — 88305 TISSUE EXAM BY PATHOLOGIST: CPT | Performed by: INTERNAL MEDICINE

## 2019-01-31 PROCEDURE — 82570 ASSAY OF URINE CREATININE: CPT | Performed by: INTERNAL MEDICINE

## 2019-01-31 PROCEDURE — 84132 ASSAY OF SERUM POTASSIUM: CPT | Performed by: INTERNAL MEDICINE

## 2019-01-31 PROCEDURE — 84300 ASSAY OF URINE SODIUM: CPT | Performed by: INTERNAL MEDICINE

## 2019-01-31 PROCEDURE — 0DB98ZX EXCISION OF DUODENUM, VIA NATURAL OR ARTIFICIAL OPENING ENDOSCOPIC, DIAGNOSTIC: ICD-10-PCS | Performed by: INTERNAL MEDICINE

## 2019-01-31 RX ORDER — HYDROCODONE BITARTRATE AND ACETAMINOPHEN 5; 325 MG/1; MG/1
2 TABLET ORAL AS NEEDED
Status: DISCONTINUED | OUTPATIENT
Start: 2019-01-31 | End: 2019-02-04 | Stop reason: HOSPADM

## 2019-01-31 RX ORDER — DEXAMETHASONE SODIUM PHOSPHATE 4 MG/ML
4 VIAL (ML) INJECTION AS NEEDED
Status: ACTIVE | OUTPATIENT
Start: 2019-01-31 | End: 2019-01-31

## 2019-01-31 RX ORDER — NALOXONE HYDROCHLORIDE 0.4 MG/ML
80 INJECTION, SOLUTION INTRAMUSCULAR; INTRAVENOUS; SUBCUTANEOUS AS NEEDED
Status: DISCONTINUED | OUTPATIENT
Start: 2019-01-31 | End: 2019-01-31

## 2019-01-31 RX ORDER — HYDROCODONE BITARTRATE AND ACETAMINOPHEN 5; 325 MG/1; MG/1
1 TABLET ORAL AS NEEDED
Status: DISCONTINUED | OUTPATIENT
Start: 2019-01-31 | End: 2019-02-04 | Stop reason: HOSPADM

## 2019-01-31 RX ORDER — ONDANSETRON 2 MG/ML
4 INJECTION INTRAMUSCULAR; INTRAVENOUS AS NEEDED
Status: ACTIVE | OUTPATIENT
Start: 2019-01-31 | End: 2019-01-31

## 2019-01-31 RX ORDER — ERYTHROMYCIN 250 MG/1
250 TABLET, DELAYED RELEASE ORAL
Status: DISCONTINUED | OUTPATIENT
Start: 2019-01-31 | End: 2019-02-07

## 2019-01-31 RX ORDER — NALOXONE HYDROCHLORIDE 0.4 MG/ML
80 INJECTION, SOLUTION INTRAMUSCULAR; INTRAVENOUS; SUBCUTANEOUS AS NEEDED
Status: ACTIVE | OUTPATIENT
Start: 2019-01-31 | End: 2019-01-31

## 2019-01-31 RX ORDER — SODIUM CHLORIDE 9 MG/ML
INJECTION, SOLUTION INTRAVENOUS CONTINUOUS
Status: DISCONTINUED | OUTPATIENT
Start: 2019-01-31 | End: 2019-02-01

## 2019-01-31 RX ORDER — ERYTHROMYCIN 250 MG/1
250 TABLET, COATED ORAL
Status: DISCONTINUED | OUTPATIENT
Start: 2019-01-31 | End: 2019-01-31

## 2019-01-31 RX ORDER — METOPROLOL TARTRATE 5 MG/5ML
2.5 INJECTION INTRAVENOUS ONCE
Status: DISCONTINUED | OUTPATIENT
Start: 2019-01-31 | End: 2019-02-04 | Stop reason: HOSPADM

## 2019-01-31 RX ORDER — DEXTROSE MONOHYDRATE 25 G/50ML
50 INJECTION, SOLUTION INTRAVENOUS
Status: DISCONTINUED | OUTPATIENT
Start: 2019-01-31 | End: 2019-01-31

## 2019-01-31 RX ORDER — HYDROMORPHONE HYDROCHLORIDE 1 MG/ML
0.5 INJECTION, SOLUTION INTRAMUSCULAR; INTRAVENOUS; SUBCUTANEOUS EVERY 5 MIN PRN
Status: ACTIVE | OUTPATIENT
Start: 2019-01-31 | End: 2019-01-31

## 2019-01-31 RX ORDER — METOCLOPRAMIDE HYDROCHLORIDE 5 MG/ML
5 INJECTION INTRAMUSCULAR; INTRAVENOUS AS NEEDED
Status: ACTIVE | OUTPATIENT
Start: 2019-01-31 | End: 2019-01-31

## 2019-01-31 RX ORDER — SODIUM CHLORIDE, SODIUM LACTATE, POTASSIUM CHLORIDE, CALCIUM CHLORIDE 600; 310; 30; 20 MG/100ML; MG/100ML; MG/100ML; MG/100ML
INJECTION, SOLUTION INTRAVENOUS CONTINUOUS
Status: DISCONTINUED | OUTPATIENT
Start: 2019-01-31 | End: 2019-02-01

## 2019-01-31 RX ORDER — ONDANSETRON 2 MG/ML
4 INJECTION INTRAMUSCULAR; INTRAVENOUS AS NEEDED
Status: DISCONTINUED | OUTPATIENT
Start: 2019-01-31 | End: 2019-01-31

## 2019-01-31 NOTE — OPERATIVE REPORT
Jimi Adjutant Roche Patient Status:  Inpatient    1950 MRN [id number]   [facility] ENDOSCOPY Attending Lorna Odom MD   Date 2019 PCP Neal Shirley Whittier      PREOPERATIVE DIAGNOSIS/INDICATION: Nausea and vomiting  POSTOPERTATIVE D

## 2019-01-31 NOTE — ANESTHESIA POSTPROCEDURE EVALUATION
25 [facility] Patient Status:  Inpatient   Age/Gender 79year old female MRN [id number]   Location 118 [facility]. Attending Katie Perales MD   Hosp Day # 2 PCP Ronel Ramirez DO       Anesthesia Post-op Note    Procedure(s)

## 2019-01-31 NOTE — PROGRESS NOTES
[facility]    Nephrology Progress Note    Pushpa Albrecht Attending:  Zenia Avila DO     Cc: SILVIA    SUBJECTIVE     Feeling a little better  No sob  No other complaints    PHYSICAL EXAM   Vital signs: /70   Pulse 97   Temp 99.5 °F (37.5 °C) ( (NARCAN) 0.4 MG/ML injection 80 mcg 80 mcg Intravenous PRN   0.9%  NaCl infusion  Intravenous Continuous   lactated ringers infusion  Intravenous Continuous   ondansetron HCl (ZOFRAN) injection 4 mg 4 mg Intravenous Q6H   Metoclopramide HCl (REGLAN) inject myeloproliferative disorder, gerd, DM2, HTN, HL, CKD, who presented from oncology office for abnormal labs     SILVIA on CKD3  -- prerenal due to poor po intake + vomiting + ARB + alleve  -- urine lytes c/w prerenal physiology.  Urine na 10  -- Improving w IVF

## 2019-01-31 NOTE — PROGRESS NOTES
[facility] Hospitalist Progress Note     Rg Albrecht Patient Status:  Inpatient    1950 MRN [id number]   [facility] 4NW-A Attending Sammy Mcwilliams MD   Hosp Day # 2 PCP Ricardo Catalan DO     CC: follow up    SUBJECTIVE:  S/p EGD this m Imaging:  Nm Gi Gastric Emptying Study  (MTL=01937)    Result Date: 1/31/2019  CONCLUSION:  MD and 4 hr is mildly below expected. This may be due to mild gastro paresis.     Dictated by: Nadira James MD on 1/31/2019 at 15:54     Approved by: Felisa Mitchell above  -hyponatremia resolved with improvement in BG control     #Rectal cancer  -oncology on consult     #DM2  -hold oral, start SSI and adjust as needed     #HTN  - hold losaratan     #HL  -cont statin     #GERD  -cont ppi     PPX: SCDs, heparin subq

## 2019-01-31 NOTE — PAYOR COMM NOTE
--------------  ADMISSION REVIEW     Payor: [facility] [place] #:  [id number]  Authorization Number: [id number]    Admit date: 1/29/19  Admit time: 5535      Direct admit from the [facility]:    ASSESSMENT AND PLAN:      Ms. Albrecht is for over a month now. She vomits often but not every day. She has ileostomy with chronic liquid stool but currently has been more \"pasty\" since she stopped lomotil several months ago. No blood in stool. No recent travel or sick contacts.  She has not bee statin    #GERD  -cont ppi    RENAL CONSULT:  Yenny Reed is a a(n) 79year old female w ho rectal cancer sp ileostomy and chemo (finished 10/18, FOLFOX-6), myeloproliferative disorder, gerd, DM2, HTN, HL, CKD, who presented from oncology office for abno intake + vomiting + ARB + alleve  -- check UA, urine lytes, orthostatics, PVR, renal US  -- continue IVFs  -- hold ARB. Avoid nephrotoxins and renally dose meds     Acidosis  -- mild. Check lactate. If wnl change fluids to bicarb     Hyperkalemia  -- mild. Sodium (Porcine)  5,000 Units Subcutaneous Q8H [facility] & long-term   • DULoxetine HCl  30 mg Oral Daily   • Pantoprazole Sodium  40 mg Oral QAM AC   • atorvastatin  10 mg Oral Nightly      Continuous Infusions:   • lactated ringers 100 mL/hr at 01/30/19 0858     \[facility] [place] [facility]\"" and actually increased over the last month. She reports nausea which occurs without regards to PO intake and will at times awaken with symptoms.  Her nausea improves with Zofran (typically used daily) and marijuana vaping which she has increased the frequen since resolved. Abd is soft, non-tender, non-distended, with BS and ileostomy output. Symptoms maybe d/t PUD, gastroparesis, cannabinoid hyperemesis syndrome, infection and do not appear to be d/t an obstruction.  Will plan for EGD in AM and continue suppor Juan F Martinez RN    1/31/2019 0103 Given 5 mg Intravenous Juan F Martinez RN    1/30/2019 1806 Given 5 mg Intravenous Jeff, Joycelyn Norris, TIANA      ondansetron HCl [facility]) injection 4 mg     Date Action Dose Route User    1/31/2019 0404 Given 4

## 2019-02-01 ENCOUNTER — APPOINTMENT (OUTPATIENT)
Dept: CT IMAGING | Facility: HOSPITAL | Age: 69
DRG: 074 | End: 2019-02-01
Attending: INTERNAL MEDICINE
Payer: MEDICARE

## 2019-02-01 ENCOUNTER — APPOINTMENT (OUTPATIENT)
Dept: ULTRASOUND IMAGING | Facility: HOSPITAL | Age: 69
DRG: 074 | End: 2019-02-01
Attending: INTERNAL MEDICINE
Payer: MEDICARE

## 2019-02-01 ENCOUNTER — APPOINTMENT (OUTPATIENT)
Dept: GENERAL RADIOLOGY | Facility: HOSPITAL | Age: 69
DRG: 074 | End: 2019-02-01
Attending: INTERNAL MEDICINE
Payer: MEDICARE

## 2019-02-01 LAB
ALBUMIN SERPL-MCNC: 2.4 G/DL (ref 3.1–4.5)
ANION GAP SERPL CALC-SCNC: 6 MMOL/L (ref 0–18)
BUN BLD-MCNC: 20 MG/DL (ref 8–20)
BUN/CREAT SERPL: 10.9 (ref 10–20)
CALCIUM BLD-MCNC: 8.7 MG/DL (ref 8.3–10.3)
CHLORIDE SERPL-SCNC: 102 MMOL/L (ref 101–111)
CO2 SERPL-SCNC: 28 MMOL/L (ref 22–32)
CREAT BLD-MCNC: 1.84 MG/DL (ref 0.55–1.02)
CREAT UR-SCNC: 139 MG/DL
GLUCOSE BLD-MCNC: 208 MG/DL (ref 65–99)
GLUCOSE BLD-MCNC: 211 MG/DL (ref 65–99)
GLUCOSE BLD-MCNC: 229 MG/DL (ref 70–99)
GLUCOSE BLD-MCNC: 261 MG/DL (ref 65–99)
GLUCOSE BLD-MCNC: 266 MG/DL (ref 65–99)
GLUCOSE BLD-MCNC: 326 MG/DL (ref 65–99)
HAV IGM SER QL: 2 MG/DL (ref 1.8–2.5)
OSMOLALITY SERPL CALC.SUM OF ELEC: 292 MOSM/KG (ref 275–295)
PHOSPHATE SERPL-MCNC: 2.7 MG/DL (ref 2.5–4.9)
POTASSIUM SERPL-SCNC: 3.8 MMOL/L (ref 3.6–5.1)
SODIUM SERPL-SCNC: 136 MMOL/L (ref 136–144)
SODIUM SERPL-SCNC: 62 MMOL/L
UUN UR-MCNC: 783 MG/DL

## 2019-02-01 PROCEDURE — 80069 RENAL FUNCTION PANEL: CPT | Performed by: INTERNAL MEDICINE

## 2019-02-01 PROCEDURE — 82570 ASSAY OF URINE CREATININE: CPT | Performed by: INTERNAL MEDICINE

## 2019-02-01 PROCEDURE — 74250 X-RAY XM SM INT 1CNTRST STD: CPT | Performed by: INTERNAL MEDICINE

## 2019-02-01 PROCEDURE — 70450 CT HEAD/BRAIN W/O DYE: CPT | Performed by: INTERNAL MEDICINE

## 2019-02-01 PROCEDURE — 83735 ASSAY OF MAGNESIUM: CPT | Performed by: INTERNAL MEDICINE

## 2019-02-01 PROCEDURE — 82962 GLUCOSE BLOOD TEST: CPT

## 2019-02-01 PROCEDURE — 84540 ASSAY OF URINE/UREA-N: CPT | Performed by: INTERNAL MEDICINE

## 2019-02-01 PROCEDURE — 84300 ASSAY OF URINE SODIUM: CPT | Performed by: INTERNAL MEDICINE

## 2019-02-01 PROCEDURE — 76770 US EXAM ABDO BACK WALL COMP: CPT | Performed by: INTERNAL MEDICINE

## 2019-02-01 RX ORDER — ONDANSETRON 2 MG/ML
4 INJECTION INTRAMUSCULAR; INTRAVENOUS EVERY 6 HOURS PRN
Status: DISCONTINUED | OUTPATIENT
Start: 2019-02-01 | End: 2019-02-07

## 2019-02-01 RX ORDER — SODIUM CHLORIDE 9 MG/ML
INJECTION, SOLUTION INTRAVENOUS CONTINUOUS
Status: DISCONTINUED | OUTPATIENT
Start: 2019-02-01 | End: 2019-02-04

## 2019-02-01 RX ORDER — MECLIZINE HCL 12.5 MG/1
12.5 TABLET ORAL 3 TIMES DAILY PRN
Status: DISCONTINUED | OUTPATIENT
Start: 2019-02-01 | End: 2019-02-07

## 2019-02-01 NOTE — CONSULTS
[facility]  Report of Consultation    Pushpa Albrecht Patient Status:  Inpatient    1950 MRN [id number]   [facility] 4NW-A Attending Shira Maldondao MD   Hosp Day # 3 PCP Shayne Wilkes DO     Reason for Consultation:  Known to us (Oxaliplatin discontinued from her treatment due to persistent neuropathy)  S/p CT scan post chemo  YONG for cancer  The lung nodules are too small and could be inflammatory   We will follow with another CT can in 3 months      She has a post op seroma in p Performed by Prem Alas MD at [address] [facility]   • IR [address], [address], CYST, FLUID COLLECTION      2/5/2018 Pelvic   • LUMBAR / TRANSFORAMINAL EPIDURAL STEROID INJECTION N/A 8/18/2014    Performed by Matt Medina DO at [address] [facility] 4-6 GM-MG chewable tab 4 tablet, 4 tablet, Oral, Q15 Min PRN **OR** dextrose 50 % injection 50 mL, 50 mL, Intravenous, Q15 Min PRN **OR** glucose (DEX4) oral liquid 30 g, 30 g, Oral, Q15 Min PRN **OR** Glucose-Vitamin C (DEX-4) 4-6 GM-MG chewable tab 8 tab CREATSERUM 1.84 02/01/2019    BUN 20 02/01/2019     02/01/2019    K 3.8 02/01/2019     02/01/2019    CO2 28.0 02/01/2019     02/01/2019    CA 8.7 02/01/2019    ALB 2.4 02/01/2019    MG 2.0 02/01/2019    PHOS 2.7 02/01/2019     Lab Result chemotherapy with FOLFOX that did not fully complete due to neuropathy. 2. MPD  3. Nausea and vomiting, gastroparesis  4. Acute on chronic renal insufficiency. Plan  1. Agree with supportive measures in place and appreciate GI and hospitalist input.   2

## 2019-02-01 NOTE — PROGRESS NOTES
Gastroenterology Progress Note  Sundar Albrecht Patient Status:  Inpatient    1950 MRN [id number]   [facility] 4NW-A Attending Miguel Klein MD   Hosp Day # 3 PCP Dee Dee Fernandez DO with nausea and  indigestion. PATIENT STATED HISTORY: (As transcribed by Technologist)  She has not been able to keep food down since Tuesday. She has been very nauseous.        FLUOROSCOPY IMAGES OBTAINED:  1  FLUOROSCOPY TIME:  32 seconds   RADIATION and vomiting  POSTOPERTATIVE DIAGNOSIS: Hiatal hernia, fundic gland polyps  Final Diagnosis:   A.   Duodenum, biopsy:  -Duodenal mucosa with no significant pathologic change.  -No morphologic evidence of gluten sensitive enteropathy.  -Negative for metaplas minutes.     Manisha Wen MD  [facility] Gastroenterology  2/1/2019  7:08 PM

## 2019-02-01 NOTE — PROGRESS NOTES
[facility]    Nephrology Progress Note    Rg Albrecht Attending:  Dionisio Lopez DO     Cc: SILVIA    SUBJECTIVE     Feeling a little better  No sob  No other complaints    PHYSICAL EXAM   Vital signs: /65 (BP Location: Left arm)   Pulse 92 Q15 Min PRN   Or      Glucose-Vitamin C (DEX-4) 4-6 GM-MG chewable tab 8 tablet 8 tablet Oral Q15 Min PRN   acetaminophen (TYLENOL) tab 650 mg 650 mg Oral Q4H PRN   Or      HYDROcodone-acetaminophen (NORCO) 5-325 MG per tab 1 tablet 1 tablet Oral Q4H PRN mild when corrected for glucose. Initial corrected sodium near 133. Hypovolemic. Improved w IVFs    Thank you for allowing me to participate in the care of this patient. Please do not hesitate to call with questions or concerns.        Tray Covarrubias MD

## 2019-02-01 NOTE — PROGRESS NOTES
Patient was able to tolerate clear liquids and states that nausea has been controlled. Plan of care reviewed with patient.

## 2019-02-01 NOTE — PROGRESS NOTES
[facility] Hospitalist Progress Note     Chuy Albrecht Patient Status:  Inpatient    1950 MRN [id number]   [facility] 4NW-A Attending Sakina Lepe MD   Hosp Day # 3 PCP Naheed Owen DO     CC: follow up    SUBJECTIVE:  Seen after mul 01/28/19   1401  01/30/19   0630  01/31/19   0615  02/01/19   0628   ALT  16   --    --    --    AST  12*   --    --    --    ALB  3.1*  2.8*  2.5*  2.4*       Recent Labs   Lab  01/31/19   0654  01/31/19   1448  01/31/19   1653  01/31/19   2044  02/01/19 recs  - continue reglan and erythromycin  - low fat low residue diet    #SILVIA on CKD   - initially improved, Cr now stable at 1.8  - renal following  - renal US pending  - IVF hydration     # Dizziness  - no evidence of acute otitis on exam though will spencer

## 2019-02-02 ENCOUNTER — APPOINTMENT (OUTPATIENT)
Dept: GENERAL RADIOLOGY | Facility: HOSPITAL | Age: 69
DRG: 074 | End: 2019-02-02
Attending: UROLOGY
Payer: MEDICARE

## 2019-02-02 LAB
ALBUMIN SERPL-MCNC: 2.2 G/DL (ref 3.1–4.5)
ANION GAP SERPL CALC-SCNC: 8 MMOL/L (ref 0–18)
BUN BLD-MCNC: 16 MG/DL (ref 8–20)
BUN/CREAT SERPL: 8.9 (ref 10–20)
CALCIUM BLD-MCNC: 8 MG/DL (ref 8.3–10.3)
CHLORIDE SERPL-SCNC: 105 MMOL/L (ref 101–111)
CO2 SERPL-SCNC: 24 MMOL/L (ref 22–32)
CREAT BLD-MCNC: 1.8 MG/DL (ref 0.55–1.02)
GLUCOSE BLD-MCNC: 179 MG/DL (ref 65–99)
GLUCOSE BLD-MCNC: 213 MG/DL (ref 70–99)
GLUCOSE BLD-MCNC: 218 MG/DL (ref 65–99)
GLUCOSE BLD-MCNC: 233 MG/DL (ref 65–99)
GLUCOSE BLD-MCNC: 317 MG/DL (ref 65–99)
HAV IGM SER QL: 1.8 MG/DL (ref 1.8–2.5)
OSMOLALITY SERPL CALC.SUM OF ELEC: 292 MOSM/KG (ref 275–295)
PHOSPHATE SERPL-MCNC: 2.6 MG/DL (ref 2.5–4.9)
POTASSIUM SERPL-SCNC: 3.8 MMOL/L (ref 3.6–5.1)
SODIUM SERPL-SCNC: 137 MMOL/L (ref 136–144)

## 2019-02-02 PROCEDURE — 80069 RENAL FUNCTION PANEL: CPT | Performed by: INTERNAL MEDICINE

## 2019-02-02 PROCEDURE — 74018 RADEX ABDOMEN 1 VIEW: CPT | Performed by: UROLOGY

## 2019-02-02 PROCEDURE — 83735 ASSAY OF MAGNESIUM: CPT | Performed by: INTERNAL MEDICINE

## 2019-02-02 PROCEDURE — 82962 GLUCOSE BLOOD TEST: CPT

## 2019-02-02 RX ORDER — METOCLOPRAMIDE HYDROCHLORIDE 5 MG/ML
5 INJECTION INTRAMUSCULAR; INTRAVENOUS EVERY 6 HOURS PRN
Status: DISCONTINUED | OUTPATIENT
Start: 2019-02-02 | End: 2019-02-07

## 2019-02-02 RX ORDER — POTASSIUM CHLORIDE 14.9 MG/ML
20 INJECTION INTRAVENOUS ONCE
Status: COMPLETED | OUTPATIENT
Start: 2019-02-02 | End: 2019-02-02

## 2019-02-02 RX ORDER — MAGNESIUM OXIDE 400 MG (241.3 MG MAGNESIUM) TABLET
400 TABLET ONCE
Status: COMPLETED | OUTPATIENT
Start: 2019-02-02 | End: 2019-02-02

## 2019-02-02 NOTE — PROGRESS NOTES
[facility] Hospitalist Progress Note     Ese Albrecht Patient Status:  Inpatient    1950 MRN [id number]   [facility] 4NW-A Attending Fabienne Mireles MD   Hosp Day # 4 PCP Naheed Ge DO     CC: follow up    SUBJECTIVE:  CLAUDIA overnight Recent Labs   Lab  02/01/19   1319  02/01/19   1739  02/01/19   2111  02/01/19   2211  02/02/19   0740   PGLU  211*  261*  326*  266*  218*       Imaging:  Ct Brain Or Head ([id number])    Result Date: 2/1/2019  CONCLUSION:  1.  No acute intracranial hemor erythromycin  - pt tolerating low fat low residue diet, symptoms improved     #SILVIA on CKD   #Mod-severe R sided hydro  #Mild L sided hydro  - Cr initially improved, Cr now stable at 1.8  - renal following  - consult urology  - continue IVF hydration     #

## 2019-02-02 NOTE — DIETARY NOTE
Nutrition Short Note    Dietitian consult received for low fiber/residue education. Also discussed DM diet. Appropriate education and handout provided. All questions answered. RD available PRN.     Jermaine Calabrese MS, RD, LDN

## 2019-02-02 NOTE — PROGRESS NOTES
Gastroenterology Progress Note  Ne Albrecht Patient Status:  Inpatient    1950 MRN [id number]   [facility] 4NW-A Attending Suzette Gongora MD   Hosp Day # 4 PCP Ko Guzmán DO with nausea and  indigestion. PATIENT STATED HISTORY: (As transcribed by Technologist)  She has not been able to keep food down since Tuesday. She has been very nauseous.        FLUOROSCOPY IMAGES OBTAINED:  1  FLUOROSCOPY TIME:  32 seconds   RADIATION and vomiting  POSTOPERTATIVE DIAGNOSIS: Hiatal hernia, fundic gland polyps  Final Diagnosis:   A.   Duodenum, biopsy:  -Duodenal mucosa with no significant pathologic change.  -No morphologic evidence of gluten sensitive enteropathy.  -Negative for metaplas

## 2019-02-02 NOTE — PLAN OF CARE
Assumed care @ 0730. Patient denies nausea, denies dizziness. Low residue diet tolerated well. Patient's vital signs stable.

## 2019-02-02 NOTE — PLAN OF CARE
A&Ox4. VSS. No complaints of pain this shift. No nausea/vomiting. Patient states that she's been N/V free for just about 48 hours. Patient reports normal output from ostomy. Scheduled Reglan administered, PRN Zofran.    XR abdomen and US kidneys completed

## 2019-02-02 NOTE — PROGRESS NOTES
[facility]    Nephrology Progress Note    Melida Albrecht Attending:  Mehrdad Braden DO     Cc: SILVIA    SUBJECTIVE     No complaints, resting comfortably in bed   No SOB or chest pain      PHYSICAL EXAM   Vital signs: /72 (BP Location: Left arm) dextrose 50 % injection 50 mL 50 mL Intravenous Q15 Min PRN   Or      glucose (DEX4) oral liquid 30 g 30 g Oral Q15 Min PRN   Or      Glucose-Vitamin C (DEX-4) 4-6 GM-MG chewable tab 8 tablet 8 tablet Oral Q15 Min PRN   acetaminophen (TYLENOL) tab 650 mg IVFs as above     Hyponatremia   - resolved  w IVFs    We will continue to follow     Tika Bledsoe MD   [address] [facility] Nephrology

## 2019-02-02 NOTE — PROGRESS NOTES
[facility]  Progress Note    Vernell Albrecht Patient Status:  Inpatient    1950 MRN [id number]   [facility] 4NW-A Attending Judy Rodriguez MD   Hosp Day # 4 PCP Naheed Arceo DO     Subjective:  Feeling OK this am. No nausea.  At

## 2019-02-03 ENCOUNTER — APPOINTMENT (OUTPATIENT)
Dept: GENERAL RADIOLOGY | Facility: HOSPITAL | Age: 69
DRG: 074 | End: 2019-02-03
Attending: UROLOGY
Payer: MEDICARE

## 2019-02-03 LAB
ALBUMIN SERPL-MCNC: 2.1 G/DL (ref 3.1–4.5)
ANION GAP SERPL CALC-SCNC: 7 MMOL/L (ref 0–18)
BILIRUB UR QL STRIP.AUTO: NEGATIVE
BUN BLD-MCNC: 14 MG/DL (ref 8–20)
BUN/CREAT SERPL: 8.1 (ref 10–20)
CALCIUM BLD-MCNC: 8.2 MG/DL (ref 8.3–10.3)
CHLORIDE SERPL-SCNC: 108 MMOL/L (ref 101–111)
CLARITY UR REFRACT.AUTO: CLEAR
CO2 SERPL-SCNC: 25 MMOL/L (ref 22–32)
COLOR UR AUTO: YELLOW
CREAT BLD-MCNC: 1.72 MG/DL (ref 0.55–1.02)
GLUCOSE BLD-MCNC: 136 MG/DL (ref 65–99)
GLUCOSE BLD-MCNC: 177 MG/DL (ref 65–99)
GLUCOSE BLD-MCNC: 180 MG/DL (ref 65–99)
GLUCOSE BLD-MCNC: 62 MG/DL (ref 65–99)
GLUCOSE BLD-MCNC: 64 MG/DL (ref 70–99)
GLUCOSE BLD-MCNC: 89 MG/DL (ref 65–99)
GLUCOSE BLD-MCNC: 96 MG/DL (ref 65–99)
GLUCOSE UR STRIP.AUTO-MCNC: NEGATIVE MG/DL
HAV IGM SER QL: 1.7 MG/DL (ref 1.8–2.5)
KETONES UR STRIP.AUTO-MCNC: NEGATIVE MG/DL
LEUKOCYTE ESTERASE UR QL STRIP.AUTO: NEGATIVE
NITRITE UR QL STRIP.AUTO: NEGATIVE
OSMOLALITY SERPL CALC.SUM OF ELEC: 289 MOSM/KG (ref 275–295)
PH UR STRIP.AUTO: 5 [PH] (ref 4.5–8)
PHOSPHATE SERPL-MCNC: 2.8 MG/DL (ref 2.5–4.9)
POTASSIUM SERPL-SCNC: 3.4 MMOL/L (ref 3.6–5.1)
POTASSIUM SERPL-SCNC: 3.4 MMOL/L (ref 3.6–5.1)
PROCALCITONIN SERPL-MCNC: 0.36 NG/ML
PROT UR STRIP.AUTO-MCNC: 30 MG/DL
SODIUM SERPL-SCNC: 140 MMOL/L (ref 136–144)
SP GR UR STRIP.AUTO: 1.01 (ref 1–1.03)
UROBILINOGEN UR STRIP.AUTO-MCNC: <2 MG/DL

## 2019-02-03 PROCEDURE — 83735 ASSAY OF MAGNESIUM: CPT | Performed by: INTERNAL MEDICINE

## 2019-02-03 PROCEDURE — 82962 GLUCOSE BLOOD TEST: CPT

## 2019-02-03 PROCEDURE — 97530 THERAPEUTIC ACTIVITIES: CPT

## 2019-02-03 PROCEDURE — 84145 PROCALCITONIN (PCT): CPT | Performed by: INTERNAL MEDICINE

## 2019-02-03 PROCEDURE — 74018 RADEX ABDOMEN 1 VIEW: CPT | Performed by: UROLOGY

## 2019-02-03 PROCEDURE — 87040 BLOOD CULTURE FOR BACTERIA: CPT | Performed by: INTERNAL MEDICINE

## 2019-02-03 PROCEDURE — 84132 ASSAY OF SERUM POTASSIUM: CPT | Performed by: INTERNAL MEDICINE

## 2019-02-03 PROCEDURE — 97162 PT EVAL MOD COMPLEX 30 MIN: CPT

## 2019-02-03 PROCEDURE — 81001 URINALYSIS AUTO W/SCOPE: CPT | Performed by: INTERNAL MEDICINE

## 2019-02-03 PROCEDURE — 80069 RENAL FUNCTION PANEL: CPT | Performed by: INTERNAL MEDICINE

## 2019-02-03 RX ORDER — HEPARIN SODIUM 5000 [USP'U]/ML
5000 INJECTION, SOLUTION INTRAVENOUS; SUBCUTANEOUS EVERY 8 HOURS SCHEDULED
Status: DISCONTINUED | OUTPATIENT
Start: 2019-02-03 | End: 2019-02-04

## 2019-02-03 RX ORDER — POTASSIUM CHLORIDE 20 MEQ/1
40 TABLET, EXTENDED RELEASE ORAL ONCE
Status: COMPLETED | OUTPATIENT
Start: 2019-02-03 | End: 2019-02-03

## 2019-02-03 RX ORDER — MAGNESIUM OXIDE 400 MG (241.3 MG MAGNESIUM) TABLET
400 TABLET ONCE
Status: COMPLETED | OUTPATIENT
Start: 2019-02-03 | End: 2019-02-03

## 2019-02-03 NOTE — PROGRESS NOTES
[facility] Hospitalist Progress Note     Amada Albrecht Patient Status:  Inpatient    1950 MRN [id number]   [facility] 4NW-A Attending Mejia Comer MD   Hosp Day # 5 PCP Naheed Mckinnon DO     CC: follow up    SUBJECTIVE:  No acute event AST  12*   --    --    --    --    --    ALB  3.1*  2.8*  2.5*  2.4*  2.2*  2.1*       Recent Labs   Lab  02/02/19 2108 02/03/19   0700  02/03/19   0726  02/03/19   0918  02/03/19   1118   PGLU  233*  62*  177*  136*  180*       Imaging:  Xr Abdomen continue reglan and erythromycin  - pt tolerating low fat low residue diet, symptoms improved     #SILVIA on CKD   - Cr initially improved, Cr now stable at 1.7-1.8  - renal following  - continue IVF hydration    #Mod-severe R sided hydro  #Mild L sided hydro

## 2019-02-03 NOTE — PHYSICAL THERAPY NOTE
PHYSICAL THERAPY EVALUATION - INPATIENT     Room Number: 421/421-A  Evaluation Date: 2/3/2019  Type of Evaluation: Initial  Physician Order: PT Eval and Treat    Presenting Problem:  SILVIA  Reason for Therapy: Mobility Dysfunction and Discharge Planning 73108, 27781 N/A 12/12/2017    Performed by Brenda Schmid MD at [address] [facility]   • [facility] 1/9/2018    Performed by Lexx Meza MD at [address] [facility] 6/26/2018    Performed by Shauna Thomson sheets and blankets)?: None   -   Sitting down on and standing up from a chair with arms (e.g., wheelchair, bedside commode, etc.): None   -   Moving from lying on back to sitting on the side of the bed?: None   How much help from another person does the p [facility]. Based on this evaluation, patient's clinical presentation is evolving and overall the evaluation complexity is considered moderate. Will f/u for 1-2 more PT sessions to review zak hallpike and/or epley maneuver.   Rec f/u with outpt PT for speciali

## 2019-02-03 NOTE — PROGRESS NOTES
[facility]    Nephrology Progress Note    Devorah Albrecht Attending:  Nohelia Muniz DO     Cc: SILVIA    SUBJECTIVE     Reports nausea and feeling tired today  Having fever, blood cultures drawn     PHYSICAL EXAM   Vital signs: /68 (BP Location: HYDROcodone-acetaminophen (NORCO) 5-325 MG per tab 2 tablet 2 tablet Oral PRN   metoprolol Tartrate (LOPRESSOR) 5 MG/5ML injection 2.5 mg 2.5 mg Intravenous Once   glucose (DEX4) oral liquid 15 g 15 g Oral Q15 Min PRN   Or      Glucose-Vitamin C (DEX-4) physiology. Urine na 10  - renal US with moderate to severe R hydro and mild L hydro - Urology consulted   -- continue IVFs  -- hold ARB. Avoid nephrotoxins and renally dose meds  -- avoid IV contrast if able.      Hypokalemia / Hypomagnesemia   - replaceme

## 2019-02-03 NOTE — CONSULTS
[facility]  Report of Consultation    Ida Albrecht Patient Status:  Inpatient    1950 MRN [id number]   [facility] 4NW-A Attending Lali Archer MD   Hosp Day # 5 PCP Latonya Thompson DO     Reason for Consultation:  Bilateral h Performed by Brenda Carter MD at [facility] ENDOSCOPY   • ESOPHAGOGASTRODUODENOSCOPY, COLONOSCOPY, POSSIBLE BIOPSY, POSSIBLE POLYPECTOMY 62531, 11372 N/A 12/12/2017    Performed by Jack Avila MD at [address] [place] [address]   • [place] 1/ Erythromycin Base (CELIA-TAB) EC tab 250 mg, 250 mg, Oral, TID CC and HS  •  HYDROcodone-acetaminophen (NORCO) 5-325 MG per tab 1 tablet, 1 tablet, Oral, PRN **OR** HYDROcodone-acetaminophen (NORCO) 5-325 MG per tab 2 tablet, 2 tablet, Oral, PRN  •  metoprol 1.7 02/03/2019    PHOS 2.8 02/03/2019    PGLU 62 02/03/2019              Imaging:  Ultrasound    Impression:  Patient Active Problem List:     Hyperlipidemia     Essential hypertension     Hypothyroid     Type 2 diabetes mellitus with stage 3 chronic kidne

## 2019-02-04 ENCOUNTER — ANESTHESIA EVENT (OUTPATIENT)
Dept: SURGERY | Facility: HOSPITAL | Age: 69
DRG: 074 | End: 2019-02-04
Payer: MEDICARE

## 2019-02-04 ENCOUNTER — ANESTHESIA (OUTPATIENT)
Dept: SURGERY | Facility: HOSPITAL | Age: 69
DRG: 074 | End: 2019-02-04
Payer: MEDICARE

## 2019-02-04 ENCOUNTER — APPOINTMENT (OUTPATIENT)
Dept: GENERAL RADIOLOGY | Facility: HOSPITAL | Age: 69
DRG: 074 | End: 2019-02-04
Attending: PHYSICIAN ASSISTANT
Payer: MEDICARE

## 2019-02-04 ENCOUNTER — APPOINTMENT (OUTPATIENT)
Dept: GENERAL RADIOLOGY | Facility: HOSPITAL | Age: 69
DRG: 074 | End: 2019-02-04
Attending: INTERNAL MEDICINE
Payer: MEDICARE

## 2019-02-04 LAB
ADENOVIRUS PCR:: NEGATIVE
ALBUMIN SERPL-MCNC: 2.1 G/DL (ref 3.1–4.5)
ANION GAP SERPL CALC-SCNC: 10 MMOL/L (ref 0–18)
B PERT DNA SPEC QL NAA+PROBE: NEGATIVE
BUN BLD-MCNC: 9 MG/DL (ref 8–20)
BUN/CREAT SERPL: 5.7 (ref 10–20)
C PNEUM DNA SPEC QL NAA+PROBE: NEGATIVE
CALCIUM BLD-MCNC: 8.4 MG/DL (ref 8.3–10.3)
CHLORIDE SERPL-SCNC: 108 MMOL/L (ref 101–111)
CO2 SERPL-SCNC: 22 MMOL/L (ref 22–32)
CORONAVIRUS 229E PCR:: NEGATIVE
CORONAVIRUS HKU1 PCR:: NEGATIVE
CORONAVIRUS NL63 PCR:: NEGATIVE
CORONAVIRUS OC43 PCR:: NEGATIVE
CREAT BLD-MCNC: 1.58 MG/DL (ref 0.55–1.02)
FLUAV RNA SPEC QL NAA+PROBE: NEGATIVE
FLUBV RNA SPEC QL NAA+PROBE: NEGATIVE
GLUCOSE BLD-MCNC: 100 MG/DL (ref 65–99)
GLUCOSE BLD-MCNC: 111 MG/DL (ref 65–99)
GLUCOSE BLD-MCNC: 125 MG/DL (ref 65–99)
GLUCOSE BLD-MCNC: 148 MG/DL (ref 65–99)
GLUCOSE BLD-MCNC: 174 MG/DL (ref 65–99)
GLUCOSE BLD-MCNC: 51 MG/DL (ref 70–99)
GLUCOSE BLD-MCNC: 60 MG/DL (ref 65–99)
GLUCOSE BLD-MCNC: 89 MG/DL (ref 65–99)
HAV IGM SER QL: 1.6 MG/DL (ref 1.8–2.5)
METAPNEUMOVIRUS PCR:: NEGATIVE
MYCOPLASMA PNEUMONIA PCR:: NEGATIVE
OSMOLALITY SERPL CALC.SUM OF ELEC: 286 MOSM/KG (ref 275–295)
PARAINFLUENZA 1 PCR:: NEGATIVE
PARAINFLUENZA 2 PCR:: NEGATIVE
PARAINFLUENZA 3 PCR:: NEGATIVE
PARAINFLUENZA 4 PCR:: NEGATIVE
PHOSPHATE SERPL-MCNC: 3.4 MG/DL (ref 2.5–4.9)
POTASSIUM SERPL-SCNC: 3.3 MMOL/L (ref 3.6–5.1)
RHINOVIRUS/ENTERO PCR:: NEGATIVE
RSV RNA SPEC QL NAA+PROBE: NEGATIVE
SODIUM SERPL-SCNC: 140 MMOL/L (ref 136–144)

## 2019-02-04 PROCEDURE — 82962 GLUCOSE BLOOD TEST: CPT

## 2019-02-04 PROCEDURE — 71046 X-RAY EXAM CHEST 2 VIEWS: CPT | Performed by: INTERNAL MEDICINE

## 2019-02-04 PROCEDURE — 80069 RENAL FUNCTION PANEL: CPT | Performed by: INTERNAL MEDICINE

## 2019-02-04 PROCEDURE — BT141ZZ FLUOROSCOPY OF KIDNEYS, URETERS AND BLADDER USING LOW OSMOLAR CONTRAST: ICD-10-PCS | Performed by: UROLOGY

## 2019-02-04 PROCEDURE — 87798 DETECT AGENT NOS DNA AMP: CPT | Performed by: INTERNAL MEDICINE

## 2019-02-04 PROCEDURE — 87633 RESP VIRUS 12-25 TARGETS: CPT | Performed by: INTERNAL MEDICINE

## 2019-02-04 PROCEDURE — 74018 RADEX ABDOMEN 1 VIEW: CPT | Performed by: PHYSICIAN ASSISTANT

## 2019-02-04 PROCEDURE — 83735 ASSAY OF MAGNESIUM: CPT | Performed by: INTERNAL MEDICINE

## 2019-02-04 PROCEDURE — 99214 OFFICE O/P EST MOD 30 MIN: CPT

## 2019-02-04 PROCEDURE — 87486 CHLMYD PNEUM DNA AMP PROBE: CPT | Performed by: INTERNAL MEDICINE

## 2019-02-04 PROCEDURE — 87581 M.PNEUMON DNA AMP PROBE: CPT | Performed by: INTERNAL MEDICINE

## 2019-02-04 RX ORDER — HYDROCODONE BITARTRATE AND ACETAMINOPHEN 5; 325 MG/1; MG/1
2 TABLET ORAL AS NEEDED
Status: DISCONTINUED | OUTPATIENT
Start: 2019-02-04 | End: 2019-02-04 | Stop reason: HOSPADM

## 2019-02-04 RX ORDER — POTASSIUM CHLORIDE 20 MEQ/1
40 TABLET, EXTENDED RELEASE ORAL ONCE
Status: COMPLETED | OUTPATIENT
Start: 2019-02-04 | End: 2019-02-05

## 2019-02-04 RX ORDER — HYDROMORPHONE HYDROCHLORIDE 1 MG/ML
0.4 INJECTION, SOLUTION INTRAMUSCULAR; INTRAVENOUS; SUBCUTANEOUS EVERY 5 MIN PRN
Status: DISCONTINUED | OUTPATIENT
Start: 2019-02-04 | End: 2019-02-04 | Stop reason: HOSPADM

## 2019-02-04 RX ORDER — SODIUM CHLORIDE, SODIUM LACTATE, POTASSIUM CHLORIDE, CALCIUM CHLORIDE 600; 310; 30; 20 MG/100ML; MG/100ML; MG/100ML; MG/100ML
INJECTION, SOLUTION INTRAVENOUS CONTINUOUS
Status: DISCONTINUED | OUTPATIENT
Start: 2019-02-04 | End: 2019-02-04 | Stop reason: HOSPADM

## 2019-02-04 RX ORDER — NALOXONE HYDROCHLORIDE 0.4 MG/ML
80 INJECTION, SOLUTION INTRAMUSCULAR; INTRAVENOUS; SUBCUTANEOUS AS NEEDED
Status: DISCONTINUED | OUTPATIENT
Start: 2019-02-04 | End: 2019-02-04 | Stop reason: HOSPADM

## 2019-02-04 RX ORDER — DEXTROSE AND SODIUM CHLORIDE 5; .9 G/100ML; G/100ML
INJECTION, SOLUTION INTRAVENOUS CONTINUOUS
Status: DISCONTINUED | OUTPATIENT
Start: 2019-02-04 | End: 2019-02-06

## 2019-02-04 RX ORDER — INSULIN ASPART 100 [IU]/ML
INJECTION, SOLUTION INTRAVENOUS; SUBCUTANEOUS
Status: DISCONTINUED
Start: 2019-02-04 | End: 2019-02-04 | Stop reason: WASHOUT

## 2019-02-04 RX ORDER — HYDROCODONE BITARTRATE AND ACETAMINOPHEN 5; 325 MG/1; MG/1
1 TABLET ORAL AS NEEDED
Status: DISCONTINUED | OUTPATIENT
Start: 2019-02-04 | End: 2019-02-04 | Stop reason: HOSPADM

## 2019-02-04 RX ORDER — DEXTROSE MONOHYDRATE 25 G/50ML
50 INJECTION, SOLUTION INTRAVENOUS
Status: DISCONTINUED | OUTPATIENT
Start: 2019-02-04 | End: 2019-02-04 | Stop reason: HOSPADM

## 2019-02-04 RX ORDER — METOCLOPRAMIDE HYDROCHLORIDE 5 MG/ML
10 INJECTION INTRAMUSCULAR; INTRAVENOUS AS NEEDED
Status: DISCONTINUED | OUTPATIENT
Start: 2019-02-04 | End: 2019-02-04 | Stop reason: HOSPADM

## 2019-02-04 RX ORDER — INSULIN ASPART 100 [IU]/ML
INJECTION, SOLUTION INTRAVENOUS; SUBCUTANEOUS ONCE
Status: COMPLETED | OUTPATIENT
Start: 2019-02-04 | End: 2019-02-04

## 2019-02-04 RX ORDER — HEPARIN SODIUM 5000 [USP'U]/ML
5000 INJECTION, SOLUTION INTRAVENOUS; SUBCUTANEOUS EVERY 8 HOURS SCHEDULED
Status: DISCONTINUED | OUTPATIENT
Start: 2019-02-04 | End: 2019-02-07

## 2019-02-04 RX ORDER — CEFAZOLIN SODIUM 1 G/3ML
INJECTION, POWDER, FOR SOLUTION INTRAMUSCULAR; INTRAVENOUS
Status: DISCONTINUED | OUTPATIENT
Start: 2019-02-04 | End: 2019-02-04 | Stop reason: HOSPADM

## 2019-02-04 RX ORDER — ONDANSETRON 2 MG/ML
4 INJECTION INTRAMUSCULAR; INTRAVENOUS AS NEEDED
Status: DISCONTINUED | OUTPATIENT
Start: 2019-02-04 | End: 2019-02-04 | Stop reason: HOSPADM

## 2019-02-04 NOTE — PROGRESS NOTES
[facility]    Progress Note    Cristy Kongkarol Albrecht Patient Status:  Inpatient    1950 MRN [id number]   [facility] 4NW-A Attending Ninoska Pyle MD   [address] Day # 6 PCP Naheed Beth DO         Subjective:   J Luis Tucker is a(n) 79 y free air. Dictated by: Topher Campbell MD on 2/03/2019 at 7:57     Approved by: Topher Campbell MD            Xr Abdomen (1 View) (CYG=[id number])    Result Date: 2/2/2019  CONCLUSION:  1.  Residual contrast is noted predominantly within the left lower quadra

## 2019-02-04 NOTE — PROGRESS NOTES
[facility]    Nephrology Progress Note    Gregorio Albrecht Attending:  Bandar Plaza DO     Cc: SILVIA    SUBJECTIVE     Feeling better today  Awaiting stent placement    PHYSICAL EXAM   Vital signs: /63   Pulse 79   Temp 99.5 °F (37.5 °C) (Oral) MG/5ML injection 2.5 mg 2.5 mg Intravenous Once   glucose (DEX4) oral liquid 15 g 15 g Oral Q15 Min PRN   Or      Glucose-Vitamin C (DEX-4) 4-6 GM-MG chewable tab 4 tablet 4 tablet Oral Q15 Min PRN   Or      dextrose 50 % injection 50 mL 50 mL Intravenous ARB. Avoid nephrotoxins and renally dose meds    Hypokalemia / Hypomagnesemia   - replacement ordered      Hypercalcemia  -- improved IVFs as above     Hyponatremia   - resolved  w IVFs    Urinary obstruction   - plan for stent placement this afternoon.

## 2019-02-04 NOTE — PROGRESS NOTES
[facility]  Progress Note    Rg Bowers Roche Patient Status:  Inpatient    1950 MRN [id number]   [facility] 4NW-A Attending Sammy Mcwilliams MD   [facility] Day # 6 PCP Naheed Alex DO     Subjective:    Feeling better      Objective: Result Value Ref Range    POC Glucose 89 65 - 99 mg/dL   POCT GLUCOSE    Collection Time: 02/03/19  9:23 PM   Result Value Ref Range    POC Glucose 96 65 - 99 mg/dL   RENAL FUNCTION PANEL    Collection Time: 02/04/19  4:09 AM   Result Value Ref Range Aspart Pen  1-5 Units Subcutaneous TID CC and HS   • DULoxetine HCl  30 mg Oral Daily   • Pantoprazole Sodium  40 mg Oral QAM AC   • atorvastatin  10 mg Oral Nightly         ASSESSMENT AND PLAN:       Ms. Jaun Romo is a 79year old woman with a history of stag

## 2019-02-04 NOTE — PHYSICAL THERAPY NOTE
Attempted on 2/4/19. Per d/w RN, pt was febrile overnight and ostomy bag is currently leaking. Pt not appropriate for PT at this time. Plan for multiple tests this PM. Will follow up later in house as appropriate.

## 2019-02-04 NOTE — PROGRESS NOTES
[facility]  Progress Note    Jovita Albrecht Patient Status:  Inpatient    1950 MRN [id number]   [facility] 4NW-A Attending Guerline Fritz MD   [facility] Day # 6 PCP Naheed Tompkins DO     Subjective:    Feels well    Objective:  Blood Value Ref Range    POC Glucose 89 65 - 99 mg/dL   POCT GLUCOSE    Collection Time: 02/03/19  9:23 PM   Result Value Ref Range    POC Glucose 96 65 - 99 mg/dL   RENAL FUNCTION PANEL    Collection Time: 02/04/19  4:09 AM   Result Value Ref Range    Glucose 7 year old woman with a history of stage IIIA (T3N1MX) rectal cancer status post neoadjuvant chemoradiation, APR and chemotherapy admitted with symptoms of gastroparesis. There are no concerns for recurrent disease.     1. Gastroparesis  -Per GI     2.  Histo

## 2019-02-04 NOTE — PROGRESS NOTES
[facility] Hospitalist Progress Note     Ruby Albrecht Patient Status:  Inpatient    1950 MRN [id number]   [facility] 4NW-A Attending Tania Grove MD   Hosp Day # 6 PCP Naheed Rolon DO     CC: follow up    SUBJECTIVE:  Nauseous yeste 2. 1*  2.1*    < > = values in this interval not displayed.        Recent Labs   Lab  02/03/19   2123  02/04/19   0704  02/04/19   0722  02/04/19   0821  02/04/19   1025   PGLU  96  60*  89  174*  125*       Imaging:        Meds:   Scheduled Medication:  • DMII  - BS on low side today d/t low PO intake yesterday  - reduce levemir to 15U BID (home dose 40U BID)  - continue SQ SSI  - accuchecks     # Rectal cancer  -oncology on consult     # HTN  - hold losartan in setting of SILVIA     # HL  -cont statin     # G

## 2019-02-04 NOTE — CM/SW NOTE
Patient may needs IV ABT upon discharge. Blood cultures pending. Discharge plan to be determined.      Bianka Nicole, 02/04/19, 1:04 PM

## 2019-02-05 ENCOUNTER — APPOINTMENT (OUTPATIENT)
Dept: INTERVENTIONAL RADIOLOGY/VASCULAR | Facility: HOSPITAL | Age: 69
DRG: 074 | End: 2019-02-05
Attending: PHYSICIAN ASSISTANT
Payer: MEDICARE

## 2019-02-05 LAB
ALBUMIN SERPL-MCNC: 2 G/DL (ref 3.1–4.5)
ANION GAP SERPL CALC-SCNC: 9 MMOL/L (ref 0–18)
BILIRUB UR QL STRIP.AUTO: NEGATIVE
BUN BLD-MCNC: 9 MG/DL (ref 8–20)
BUN/CREAT SERPL: 5.1 (ref 10–20)
CALCIUM BLD-MCNC: 8.1 MG/DL (ref 8.3–10.3)
CHLORIDE SERPL-SCNC: 108 MMOL/L (ref 101–111)
CO2 SERPL-SCNC: 20 MMOL/L (ref 22–32)
COLOR UR AUTO: YELLOW
CREAT BLD-MCNC: 1.75 MG/DL (ref 0.55–1.02)
GLUCOSE BLD-MCNC: 141 MG/DL (ref 65–99)
GLUCOSE BLD-MCNC: 166 MG/DL (ref 65–99)
GLUCOSE BLD-MCNC: 187 MG/DL (ref 65–99)
GLUCOSE BLD-MCNC: 190 MG/DL (ref 70–99)
GLUCOSE BLD-MCNC: 205 MG/DL (ref 65–99)
GLUCOSE UR STRIP.AUTO-MCNC: 50 MG/DL
GRAN CASTS #/AREA URNS LPF: PRESENT /LPF
HAV IGM SER QL: 1.4 MG/DL (ref 1.8–2.5)
HYALINE CASTS #/AREA URNS AUTO: PRESENT /LPF
INR BLD: 1.31 (ref 0.9–1.1)
KETONES UR STRIP.AUTO-MCNC: 20 MG/DL
NITRITE UR QL STRIP.AUTO: NEGATIVE
OSMOLALITY SERPL CALC.SUM OF ELEC: 288 MOSM/KG (ref 275–295)
PH UR STRIP.AUTO: 5 [PH] (ref 4.5–8)
PHOSPHATE SERPL-MCNC: 3.2 MG/DL (ref 2.5–4.9)
POTASSIUM SERPL-SCNC: 3.9 MMOL/L (ref 3.6–5.1)
PROT UR STRIP.AUTO-MCNC: 30 MG/DL
PSA SERPL DL<=0.01 NG/ML-MCNC: 16.8 SECONDS (ref 12.4–14.7)
SODIUM SERPL-SCNC: 137 MMOL/L (ref 136–144)
SP GR UR STRIP.AUTO: 1.01 (ref 1–1.03)
UROBILINOGEN UR STRIP.AUTO-MCNC: <2 MG/DL
WBC #/AREA URNS AUTO: >50 /HPF

## 2019-02-05 PROCEDURE — 99152 MOD SED SAME PHYS/QHP 5/>YRS: CPT

## 2019-02-05 PROCEDURE — 87070 CULTURE OTHR SPECIMN AEROBIC: CPT | Performed by: INTERNAL MEDICINE

## 2019-02-05 PROCEDURE — 87075 CULTR BACTERIA EXCEPT BLOOD: CPT | Performed by: RADIOLOGY

## 2019-02-05 PROCEDURE — 87075 CULTR BACTERIA EXCEPT BLOOD: CPT | Performed by: INTERNAL MEDICINE

## 2019-02-05 PROCEDURE — 80069 RENAL FUNCTION PANEL: CPT | Performed by: INTERNAL MEDICINE

## 2019-02-05 PROCEDURE — 87205 SMEAR GRAM STAIN: CPT | Performed by: INTERNAL MEDICINE

## 2019-02-05 PROCEDURE — 99153 MOD SED SAME PHYS/QHP EA: CPT

## 2019-02-05 PROCEDURE — 87086 URINE CULTURE/COLONY COUNT: CPT | Performed by: INTERNAL MEDICINE

## 2019-02-05 PROCEDURE — 81001 URINALYSIS AUTO W/SCOPE: CPT | Performed by: INTERNAL MEDICINE

## 2019-02-05 PROCEDURE — 83735 ASSAY OF MAGNESIUM: CPT | Performed by: INTERNAL MEDICINE

## 2019-02-05 PROCEDURE — 50432 PLMT NEPHROSTOMY CATHETER: CPT

## 2019-02-05 PROCEDURE — 85610 PROTHROMBIN TIME: CPT | Performed by: INTERNAL MEDICINE

## 2019-02-05 PROCEDURE — 82962 GLUCOSE BLOOD TEST: CPT

## 2019-02-05 PROCEDURE — 0T9030Z DRAINAGE OF RIGHT KIDNEY WITH DRAINAGE DEVICE, PERCUTANEOUS APPROACH: ICD-10-PCS | Performed by: RADIOLOGY

## 2019-02-05 RX ORDER — LIDOCAINE HYDROCHLORIDE 10 MG/ML
INJECTION, SOLUTION INFILTRATION; PERINEURAL
Status: COMPLETED
Start: 2019-02-05 | End: 2019-02-05

## 2019-02-05 RX ORDER — MAGNESIUM SULFATE HEPTAHYDRATE 40 MG/ML
2 INJECTION, SOLUTION INTRAVENOUS ONCE
Status: COMPLETED | OUTPATIENT
Start: 2019-02-05 | End: 2019-02-05

## 2019-02-05 RX ORDER — MIDAZOLAM HYDROCHLORIDE 1 MG/ML
INJECTION INTRAMUSCULAR; INTRAVENOUS
Status: COMPLETED
Start: 2019-02-05 | End: 2019-02-05

## 2019-02-05 RX ORDER — LEVOFLOXACIN 5 MG/ML
INJECTION, SOLUTION INTRAVENOUS
Status: COMPLETED
Start: 2019-02-05 | End: 2019-02-05

## 2019-02-05 RX ORDER — ACETAMINOPHEN 325 MG/1
650 TABLET ORAL ONCE
Status: COMPLETED | OUTPATIENT
Start: 2019-02-05 | End: 2019-02-05

## 2019-02-05 NOTE — PROGRESS NOTES
Pt returned from procedure with mild discomfort however declined pain medication. Pt received SSI while down for procedure for her blood glucose level. Levemir given after pt returned. Pt AOx4. VSS. IVF running per [facility].  Pt had night time meds around midnig

## 2019-02-05 NOTE — PAYOR COMM NOTE
--------------  REQUEST FOR IMMEDIATE RECONSIDERATION OF DENIAL BASED ON NEW CLINICAL BELOW (SEE 2/5 NOTE)      CONTINUED STAY REVIEW    Payor: [facility] [place] #:  [id number]  Authorization Number: [id number]    1/31:         Angie Quintero monitor in setting of low grade temp  - check NC CT scan  - possible BPPV.  Consider PT/OT consult for epley maneuver if above negative  - meclizine PRN     # Low grade temp  - isolated temp of 100.4  - continue to monitor, may need infectious w/u     #Hypo Acute on chronic renal insufficiency.     Plan  1. Agree with supportive measures in place and appreciate GI and hospitalist input. 2. She is getting renal U/S and abdominal xrays today. EGD results noted  3.  Scr is slowly improving with hydration and ti temps.     OBJECTIVE:  Temp:  [99.5 °F (37.5 °C)-100.4 °F (38 °C)] 100.1 °F (37.8 °C)  Pulse:  [81-93] 81  Resp:  [18] 18  BP: (134149)/(58-68) 134/68    Lab  01/30/19   0630  01/31/19   0615  02/01/19   8385  02/02/19   0625  02/03/19   0604   NA  136  1 tolerating low fat low residue diet, symptoms improved     #SILVIA on CKD   - Cr initially improved, Cr now stable at 1.7-1.8  - renal following  - continue IVF hydration     #Mod-severe R sided hydro  #Mild L sided hydro  - urology following  - plan for repe STAT prior to stenting. NPO - last had apple juice at 0800  Consent for cystoscopy, bilateral retrograde pyelograms, bilateral ureteral stent placement.     Lab Results   Component Value Date     WBC 9.4 01/30/2019     HGB 11.0 (L) 01/30/2019     HCT 33.4 resolution of nausea  - meclizine PRN     # DMII  - BS on low side today d/t low PO intake yesterday  - reduce levemir to 15U BID (home dose 40U BID)  - continue SQ SSI  - accuchecks    2/5:    UROLOGY:   Temp yesterday 101.2 and temp of 101.4 this AM.

## 2019-02-05 NOTE — PROGRESS NOTES
Pt.awake on shift change;discussed diet NPO and time of procedure for NT placement;lab drawn for pt/inr;dr.mc denny onn the unit;verified insulin to be given or not since NPO;hospitalist said to hold levimir and give novolog  1325==transport here now to ta

## 2019-02-05 NOTE — PLAN OF CARE
METABOLIC/FLUID AND ELECTROLYTES - ADULT    • Hemodynamic stability and optimal renal function maintained Not Progressing          Diabetes/Glucose Control    • Glucose maintained within prescribed range Progressing        GASTROINTESTINAL - ADULT    • Min

## 2019-02-05 NOTE — PROGRESS NOTES
Pt with temperature of 101.4. Tylenol given and Dr. Shahram Kovacs notified. Recheck of temp 1 hour 20 minutes later after tylenol and temp increased to 102.0. Dr Shahram Kovacs ordered another dose of tylenol and ordered UA.  Once pt provide urine same given 1 gram ceftria

## 2019-02-05 NOTE — PROCEDURES
[facility]  Pre-Procedure Note    Name: Sheldon Bceker  MRN#: [id number]  : 1950    Procedure:  Percutaneous right nephrostomy. Indication: Hydronephrosis. Obstructed right ureter. Fevers. SILVIA    Allergies:       Ace Inhibitors          Coug

## 2019-02-05 NOTE — CONSULTS
[facility]  Report of Inpatient Ostomy Consultation    Ese Albrecht Patient Status:  Inpatient    1950 MRN [id number]   [facility] 4NW-A Attending Fabienne Mireles MD     History of Present Illness:  Mimi Aponte is a a(n) 79 y POLYPECTOMY [phone], 21986 N/A 12/12/2017    Performed by Donnie William MD at [address]   • [facility] 1/9/2018    Performed by Mary Lott MD at [address] [facility] 6/26/2018    Performed by barrier ring, pouch 77449 with ostomy belt. Good seal obtained and extra pouches at bedside for pt or nurse to use. Thank you for allowing me to participate in the care of your patient.       Amish Jung RN, BSN, [facility] [place]  Wound/Ostomy care pager [phone]  2/

## 2019-02-05 NOTE — ANESTHESIA POSTPROCEDURE EVALUATION
[address] [facility] Patient Status:  Inpatient   Age/Gender 79year old female MRN [id number]   Location [address] [facility] Attending Ruma Holliday MD   [address] [facility] [address] Day # 6 PCP Diandra Mittal,        Anesthesia Post-op Note

## 2019-02-05 NOTE — PROGRESS NOTES
[facility]    Nephrology Progress Note    Tho Albrecht Attending:  León Siu MD       SUBJECTIVE:     Underwent cystoscopy and bilateral retrograde pyelograms yesterday  L ureter was patent  R ureter was obstructed, no stent placed, needs per 01/30/2019    HCT 33.4 (L) 01/30/2019    .0 01/30/2019    MCV 85.9 01/30/2019    MCH 28.3 01/30/2019    MCHC 32.9 01/30/2019    RDW 15.9 (H) 01/30/2019    NEPRELIM 7.69 01/30/2019    NEUTABS 6.50 11/13/2018    LYMPHABS 0.62 (L) 11/13/2018    EOSABS PRN   Erythromycin Base (CELIA-TAB) EC tab 250 mg 250 mg Oral TID CC and HS   glucose (DEX4) oral liquid 15 g 15 g Oral Q15 Min PRN   Or      Glucose-Vitamin C (DEX-4) 4-6 GM-MG chewable tab 4 tablet 4 tablet Oral Q15 Min PRN   Or      dextrose 50 % injectio appreciate urology  -- plan for perc neph tube placement    Pyuria:  -- empiric ceftriaxone  -- urine culture  -- perc neph tube    Thank you for allowing me to participate in the care of this patient.  Please do not hesitate to call with questions or gunner

## 2019-02-05 NOTE — PROCEDURES
25 [facility] Patient Status:  Inpatient    1950 MRN [id number]   Location [address] [facility] Attending Darryl Luz MD   [place] Day # 7 PCP Parag Quigley DO         Brief Procedure Report    Pre-Operative D

## 2019-02-05 NOTE — ANESTHESIA PREPROCEDURE EVALUATION
PRE-OP EVALUATION    Patient Name: Reymundo Reyes    Pre-op Diagnosis: Ureteral calculi [N20.1]    Procedure(s):  CYSTOSCOPY, BILATERAL RETROGRADE, PYELOGRAM, BILATERAL URETERAL STENT PLACEMENT    Surgeon(s) and Role:     * Shayna Mendez MD - Primary    Pre (NORCO) 5-325 MG per tab 2 tablet 2 tablet Oral PRN   [] HYDROmorphone HCl (DILAUDID) 1 MG/ML injection 0.5 mg 0.5 mg Intravenous Q5 Min PRN   [] Metoclopramide HCl (REGLAN) injection 5 mg 5 mg Intravenous PRN   metoprolol Tartrate (LOPRESSOR renal disease and CRI      (+) colon cancer             Cardiovascular      ECG reviewed.       MET: >4    (+) obesity  (+) hypertension and well controlled  (+) hyperlipidemia                                  Endo/Other      (+) diabetes  type 2, using ins OVARY/TUBE(S)  2003    at same time as colectomy; at [facility]     Social History    Tobacco Use      Smoking status: Former Smoker      Smokeless tobacco: Never Used      Tobacco comment: Quit 13 years ago    Alcohol use:  Yes      Alcohol/week: 0.6 oz      Type

## 2019-02-05 NOTE — PROGRESS NOTES
[facility] Hospitalist Progress Note     Rg Albrecht Patient Status:  Inpatient    1950 MRN [id number]   [facility] 4NW-A Attending Sammy Mcwilliams MD   Hosp Day # 7 PCP Naheed Alex DO     CC: follow up    SUBJECTIVE:  Febrile overni 02/04/19   1719  02/04/19   2159  02/05/19   0758   PGLU  125*  100*  111*  148*  187*       Imaging:  Xr Abdomen (1 View) (cpt=74018)    Result Date: 2/4/2019  CONCLUSION:  Persistent contrast in the colon.     Dictated by: Josue Burrell MD on 2/04/2019 renal/urology following     # Dizziness - resolved  - no physical exam evidence of acute otitis   -  NC CT scan negative  - improved after meclizine and resolution of nausea  - meclizine PRN     # DMII  - BS mostly well controlled  - levemir to 15U BID (ho

## 2019-02-05 NOTE — PROGRESS NOTES
[facility]  Urology Progress Note    Spencer Albrecht Patient Status:  Inpatient    1950 MRN [id number]   [facility] 4NW-A Attending Chanell Villalobos MD   [facility] Day # 7 PCP Naheed Clayton DO     Subjective:  Abeba Brooks is a(n) 79 Mary Lou May P.A.-C  [facility] Urology  2/5/2019  8:43 AM

## 2019-02-05 NOTE — OPERATIVE REPORT
25 [facility] Patient Status:  Inpatient    1950 MRN [id number]   [facility] SURGERY Attending Darryl Luz MD   [facility] Day # 6 PCP Ramno Tompkins DO     Date of Operation:  2019    Procedure: Cystoscopy,  Ed severe narrowing several centimeters below the iliac vessels. I could not get the scope beyond this area. I tried to pass a guidewire through the scope but could not get a guidewire to go above the distal ureter.   There was some contrast extravasation fr

## 2019-02-06 LAB
ALBUMIN SERPL-MCNC: 2 G/DL (ref 3.1–4.5)
ANION GAP SERPL CALC-SCNC: 8 MMOL/L (ref 0–18)
BUN BLD-MCNC: 9 MG/DL (ref 8–20)
BUN/CREAT SERPL: 5.4 (ref 10–20)
CALCIUM BLD-MCNC: 8.2 MG/DL (ref 8.3–10.3)
CHLORIDE SERPL-SCNC: 108 MMOL/L (ref 101–111)
CO2 SERPL-SCNC: 21 MMOL/L (ref 22–32)
CREAT BLD-MCNC: 1.67 MG/DL (ref 0.55–1.02)
DEPRECATED RDW RBC AUTO: 51.8 FL (ref 35.1–46.3)
ERYTHROCYTE [DISTWIDTH] IN BLOOD BY AUTOMATED COUNT: 15.7 % (ref 11–15)
GLUCOSE BLD-MCNC: 170 MG/DL (ref 65–99)
GLUCOSE BLD-MCNC: 171 MG/DL (ref 65–99)
GLUCOSE BLD-MCNC: 177 MG/DL (ref 70–99)
GLUCOSE BLD-MCNC: 193 MG/DL (ref 65–99)
GLUCOSE BLD-MCNC: 194 MG/DL (ref 65–99)
HAV IGM SER QL: 2 MG/DL (ref 1.8–2.5)
HCT VFR BLD AUTO: 27.2 % (ref 35–48)
HGB BLD-MCNC: 8.7 G/DL (ref 12–16)
MCH RBC QN AUTO: 28.5 PG (ref 26–34)
MCHC RBC AUTO-ENTMCNC: 32 G/DL (ref 31–37)
MCV RBC AUTO: 89.2 FL (ref 80–100)
OSMOLALITY SERPL CALC.SUM OF ELEC: 287 MOSM/KG (ref 275–295)
PHOSPHATE SERPL-MCNC: 3.6 MG/DL (ref 2.5–4.9)
PLATELET # BLD AUTO: 317 10(3)UL (ref 150–450)
POTASSIUM SERPL-SCNC: 3.7 MMOL/L (ref 3.6–5.1)
RBC # BLD AUTO: 3.05 X10(6)UL (ref 3.8–5.3)
SODIUM SERPL-SCNC: 137 MMOL/L (ref 136–144)
WBC # BLD AUTO: 9 X10(3) UL (ref 4–11)

## 2019-02-06 PROCEDURE — 83735 ASSAY OF MAGNESIUM: CPT | Performed by: INTERNAL MEDICINE

## 2019-02-06 PROCEDURE — 85027 COMPLETE CBC AUTOMATED: CPT | Performed by: INTERNAL MEDICINE

## 2019-02-06 PROCEDURE — 82962 GLUCOSE BLOOD TEST: CPT

## 2019-02-06 PROCEDURE — 80069 RENAL FUNCTION PANEL: CPT | Performed by: INTERNAL MEDICINE

## 2019-02-06 RX ORDER — POTASSIUM CHLORIDE 20 MEQ/1
40 TABLET, EXTENDED RELEASE ORAL ONCE
Status: COMPLETED | OUTPATIENT
Start: 2019-02-06 | End: 2019-02-06

## 2019-02-06 RX ORDER — SODIUM CHLORIDE 9 MG/ML
INJECTION, SOLUTION INTRAVENOUS CONTINUOUS
Status: ACTIVE | OUTPATIENT
Start: 2019-02-06 | End: 2019-02-06

## 2019-02-06 NOTE — PROGRESS NOTES
[facility]    Nephrology Progress Note    Shayna Albrecht Attending:  Katie Perales MD       SUBJECTIVE:     Underwent R perc neph tube yesterday, good output in drain  Lot of pain at the insertion site  Does not feel well overall  Still has very wa 02/06/2019    .0 02/06/2019    MCV 89.2 02/06/2019    MCH 28.5 02/06/2019    MCHC 32.0 02/06/2019    RDW 15.7 (H) 02/06/2019    NEPRELIM 7.69 01/30/2019    NEUTABS 6.50 11/13/2018    LYMPHABS 0.62 (L) 11/13/2018    EOSABS 0.18 11/13/2018    BASABS 0 (CELIA-TAB) EC tab 250 mg 250 mg Oral TID CC and HS   glucose (DEX4) oral liquid 15 g 15 g Oral Q15 Min PRN   Or      Glucose-Vitamin C (DEX-4) 4-6 GM-MG chewable tab 4 tablet 4 tablet Oral Q15 Min PRN   Or      dextrose 50 % injection 50 mL 50 mL Intravenou appreciate urology, IR  -- s/p R perc neph tube placement 2/5/19     Pyuria:  -- empiric ceftriaxone  -- urine culture  -- perc neph tube      Thank you for allowing me to participate in the care of this patient.  Please do not hesitate to call with dm

## 2019-02-06 NOTE — PROGRESS NOTES
[facility]  Progress Note    Raghu Albrecht Patient Status:  Inpatient    1950 MRN [id number]   [facility] 4NW-A Attending Ancelmo Umana MD   [facility] Day # 8 PCP Naheed Moralez,      Subjective:    Feels well  No new complaints >=60    GFR, -American 36 (L) >=60    Albumin 2.0 (L) 3.1 - 4.5 g/dL    Phosphorus 3.6 2.5 - 4.9 mg/dL   MAGNESIUM    Collection Time: 02/06/19  6:11 AM   Result Value Ref Range    Magnesium 2.0 1.8 - 2.5 mg/dL   POCT GLUCOSE    Collection Time: 02/

## 2019-02-06 NOTE — CM/SW NOTE
Interdisciplinary Rounds: 02/06/19  Admitted: 1/29/2019 LOS: 8  Disciplines in attendance: charge nurse, staff nurse, CM, [address] and discharge plan reviewed. Home with family.     Active issues needing resolution prior to discharge: S/P percutaneo

## 2019-02-06 NOTE — PROGRESS NOTES
[facility]  Urology Progress Note    Raghu Albrecht Patient Status:  Inpatient    1950 MRN [id number]   [facility] 4NW-A Attending Ancelmo Umana MD   [facility] Day # 8 PCP Naheed Moralez,      Subjective:  Reymundo Reyes is a(n) 79 this.  Antegrade nephrostogram to be done after final cultures are ready. Suspect she will need to have the right ureter reimplanted    Status-post right NT placement 2/5/19    Check final culture results. On Willian.     Dalia Jennings P.A.-C  [facility]

## 2019-02-06 NOTE — PROGRESS NOTES
[facility] Hospitalist Progress Note     Chuy Albrecht Patient Status:  Inpatient    1950 MRN [id number]   [facility] 4NW-A Attending Sakina Lepe MD   Hosp Day # 8 PCP Naheed Owen DO     CC: follow up    SUBJECTIVE:  S/p nephrostom 2/5/2019  CONCLUSION:  Successful placement of an 8 [place] percutaneous nephrostomy catheter into the right kidney as described above.      Dictated by: Sharon Styles MD on 2/05/2019 at 17:13     Approved by: Sharon Styles MD              Meds: current regimen until consistent trend  - levemir to 15U BID (home dose 40U BID)  - continue SQ SSI  - accuchecks     # Rectal cancer  -oncology on consult     # HTN  - hold losartan in setting of SILVIA     # HL  -cont statin     # GERD  -cont ppi     PPX: S

## 2019-02-07 VITALS
OXYGEN SATURATION: 97 % | DIASTOLIC BLOOD PRESSURE: 61 MMHG | BODY MASS INDEX: 30 KG/M2 | WEIGHT: 177.69 LBS | HEART RATE: 68 BPM | TEMPERATURE: 99 F | SYSTOLIC BLOOD PRESSURE: 122 MMHG | RESPIRATION RATE: 18 BRPM

## 2019-02-07 LAB
ALBUMIN SERPL-MCNC: 1.9 G/DL (ref 3.1–4.5)
ANION GAP SERPL CALC-SCNC: 7 MMOL/L (ref 0–18)
BUN BLD-MCNC: 9 MG/DL (ref 8–20)
BUN/CREAT SERPL: 5.6 (ref 10–20)
CALCIUM BLD-MCNC: 8 MG/DL (ref 8.3–10.3)
CHLORIDE SERPL-SCNC: 112 MMOL/L (ref 101–111)
CO2 SERPL-SCNC: 21 MMOL/L (ref 22–32)
CREAT BLD-MCNC: 1.62 MG/DL (ref 0.55–1.02)
DEPRECATED RDW RBC AUTO: 52.1 FL (ref 35.1–46.3)
ERYTHROCYTE [DISTWIDTH] IN BLOOD BY AUTOMATED COUNT: 15.8 % (ref 11–15)
GLUCOSE BLD-MCNC: 144 MG/DL (ref 65–99)
GLUCOSE BLD-MCNC: 150 MG/DL (ref 70–99)
GLUCOSE BLD-MCNC: 209 MG/DL (ref 65–99)
HAV IGM SER QL: 1.8 MG/DL (ref 1.8–2.5)
HCT VFR BLD AUTO: 26.2 % (ref 35–48)
HGB BLD-MCNC: 8.3 G/DL (ref 12–16)
MCH RBC QN AUTO: 28.7 PG (ref 26–34)
MCHC RBC AUTO-ENTMCNC: 31.7 G/DL (ref 31–37)
MCV RBC AUTO: 90.7 FL (ref 80–100)
OSMOLALITY SERPL CALC.SUM OF ELEC: 292 MOSM/KG (ref 275–295)
PHOSPHATE SERPL-MCNC: 3.5 MG/DL (ref 2.5–4.9)
PLATELET # BLD AUTO: 310 10(3)UL (ref 150–450)
POTASSIUM SERPL-SCNC: 4.1 MMOL/L (ref 3.6–5.1)
POTASSIUM SERPL-SCNC: 4.1 MMOL/L (ref 3.6–5.1)
RBC # BLD AUTO: 2.89 X10(6)UL (ref 3.8–5.3)
SODIUM SERPL-SCNC: 140 MMOL/L (ref 136–144)
WBC # BLD AUTO: 8.8 X10(3) UL (ref 4–11)

## 2019-02-07 PROCEDURE — 82962 GLUCOSE BLOOD TEST: CPT

## 2019-02-07 PROCEDURE — 80069 RENAL FUNCTION PANEL: CPT | Performed by: INTERNAL MEDICINE

## 2019-02-07 PROCEDURE — 83735 ASSAY OF MAGNESIUM: CPT | Performed by: INTERNAL MEDICINE

## 2019-02-07 PROCEDURE — 85027 COMPLETE CBC AUTOMATED: CPT | Performed by: INTERNAL MEDICINE

## 2019-02-07 PROCEDURE — 84132 ASSAY OF SERUM POTASSIUM: CPT | Performed by: INTERNAL MEDICINE

## 2019-02-07 RX ORDER — HYDROCODONE BITARTRATE AND ACETAMINOPHEN 5; 325 MG/1; MG/1
1 TABLET ORAL EVERY 6 HOURS PRN
Qty: 20 TABLET | Refills: 0 | Status: SHIPPED | OUTPATIENT
Start: 2019-02-07 | End: 2019-02-21 | Stop reason: ALTCHOICE

## 2019-02-07 RX ORDER — CEFUROXIME AXETIL 500 MG/1
500 TABLET ORAL 2 TIMES DAILY
Qty: 6 TABLET | Refills: 0 | Status: SHIPPED | OUTPATIENT
Start: 2019-02-07 | End: 2019-02-21 | Stop reason: ALTCHOICE

## 2019-02-07 NOTE — PROGRESS NOTES
[facility] Hospitalist Progress Note     PCP: Troy Hilliard DO    SUBJECTIVE:  No CP, SOB, or N/V.    OBJECTIVE:  Temp:  [98.3 °F (36.8 °C)-98.4 °F (36.9 °C)] 98.4 °F (36.9 °C)  Pulse:  [72-80] 80  Resp:  [18] 18  BP: (125-133)/(62-67) 125/67    Intake/Output: • insulin detemir  15 Units Subcutaneous Nightly   • insulin detemir  15 Units Subcutaneous QAM   • Heparin Sodium (Porcine)  5,000 Units Subcutaneous Q8H [address]   • Erythromycin Base  250 mg Oral TID CC and HS   • Insulin Aspart Pen  1-5 Units Subcutaneous bedside.     Total Time spent with patient and coordinating care:  25 minutes    Thank Caitlyn Arias, [facility] Hospitalist  Pager: [phone]  Answering Service: [phone]

## 2019-02-07 NOTE — PLAN OF CARE
Diabetes/Glucose Control    • Glucose maintained within prescribed range Progressing        Impaired Functional Mobility    • Achieve highest/safest level of mobility/gait Progressing        METABOLIC/FLUID AND ELECTROLYTES - ADULT    • Hemodynamic stabili

## 2019-02-07 NOTE — PROGRESS NOTES
[facility]  Urology Progress Note    Ese Albrecht Patient Status:  Inpatient    1950 MRN [id number]   [facility] 4NW-A Attending Virginia Lopez,    Hosp Day # 9 PCP Naheed Ge DO     Subjective:  Mimi Aponte is a(n 79 yea recent pelvic fluid drainage might relate to this. Antegrade nephrostogram discussed with IR and Dr. Hernandez Expose - will defer at this time.       Suspect she will need to have the right ureter reimplanted     Status-post right NT placement 2/5/19    Check final c

## 2019-02-07 NOTE — PROGRESS NOTES
[facility]    Nephrology Progress Note    Spencer Albrecht Attending:  Elias Voss, DO       SUBJECTIVE:     Feeling better today but still has pain in perc neph tube insertion site with movement.     PHYSICAL EXAM:     Vital Signs: /67 (BP Locati MCHC 31.7 02/07/2019    RDW 15.8 (H) 02/07/2019    NEPRELIM 7.69 01/30/2019    NEUTABS 6.50 11/13/2018    LYMPHABS 0.62 (L) 11/13/2018    EOSABS 0.18 11/13/2018    BASABS 0.00 11/13/2018    NEPERCENT 82.1 01/30/2019    LYPERCENT 4.6 01/30/2019    MOPERCENT tablet Oral Q15 Min PRN   Or      dextrose 50 % injection 50 mL 50 mL Intravenous Q15 Min PRN   Or      glucose (DEX4) oral liquid 30 g 30 g Oral Q15 Min PRN   Or      Glucose-Vitamin C (DEX-4) 4-6 GM-MG chewable tab 8 tablet 8 tablet Oral Q15 Min PRN   ac discharge. Follow up in nephrology clinic in 3-4 weeks. Our office will call patient to schedule. Nephrology will sign off. Thank you for allowing me to participate in the care of this patient.  Please do not hesitate to call with questions or concer

## 2019-02-07 NOTE — DISCHARGE SUMMARY
General Medicine Discharge Summary     Patient ID:  Irene Ramirez  79year old  6/23/1950    Admit date: 1/29/2019    Discharge date and time: 2/7/19    Attending Physician: DO Cleve Florez DMII  - levemir continued    # Rectal cancer  -oncology on consult     # HTN  - resume losartan as SILVIA resolved     # HL  -cont statin     # GERD  -cont ppi            Consults: NURSING CONSULT TO DIETITIAN  IP CONSULT TO NEPHROLOGY  IP CONSULT TO ONCOLOGY free air.     Dictated by: Adrian Balderas MD on 2/03/2019 at 7:57     Approved by: Adrian Balderas MD            Xr Abdomen (1 View) (cpt=74018)    Result Date: 2/2/2019  PROCEDURE:  XR ABDOMEN (1 VIEW) (CPT=74018)  INDICATIONS:  to check barium for CT sca Patient was diagnosed in December 2017 with rectal cancer with surgical resection and ileostomy placed May 2018. Patient had recent colonoscopy performed January 10, 2019 which demonstrated hemorrhoids, diverticulosis, patent anastomosis, and poor prep.  Pa evidence of stricture, fistula, or extravasation. 2. Scattered diverticula.     Xr Small Bowel (cpt=74250)    Result Date: 2/1/2019  PROCEDURE:  XR SMALL BOWEL (CPT=74250)  TECHNIQUE:  Small bowel series with multiple serial films was performed in the usual acute intracranial hemorrhage. Mild periventricular  and subcortical low attenuation are nonspecific but most consistent with chronic small vessel  ischemic change. Calcifications are present within the basal ganglia bilaterally.   SINUSES:           No si patient with a standard 300 kcal meal including one slice of bread, a pat of butter, and 8 oz water for volume. Static anterior and posterior immediate,  1 hour, 2 hour and 4 hour images were obtained.  Decay-corrected gastric emptying values were derived assessed and continuous pulse oximetry monitoring, continuous heart rate monitoring and blood pressure monitoring was performed during the exam.   The right kidney was localized sonographically. 1% lidocaine was utilized as local anesthetic.   A 21 gauge n TECHNIQUE:   Bilateral screening digital mammographic views with tomosynthesis. Images were checked with the [employer] CAD system. ---------------------------------------- FINDINGS:   Breast Density B:  The breasts demonstrate scattered fibroglandular densities. mouth daily. simvastatin 20 MG Oral Tab  Take 1 tablet (20 mg total) by mouth nightly. Losartan Potassium 25 MG Oral Tab  1  Tab daily.     !! TIFFANIE SOLOSTAR 100 UNIT/ML Subcutaneous Solution Pen-injector  INJECT SUBCUTANEOUSLY 40  UNITS TWO TIMES DA

## 2019-02-07 NOTE — PLAN OF CARE
Impaired Functional Mobility    • Achieve highest/safest level of mobility/gait Adequate for Discharge          Diabetes/Glucose Control    • Glucose maintained within prescribed range Progressing        GASTROINTESTINAL - ADULT    • Minimal or absence of

## 2019-02-07 NOTE — PROGRESS NOTES
Discharged patient in stable condition,Jose cath heparinized and de accessed,home with rt nephrostomy and ileostomy,Instructed on f/u appt   With Md,verbalized needs.

## 2019-02-07 NOTE — PHYSICAL THERAPY NOTE
Attempted on 2/7/19. Pt refused because she was symptomatic after last session of vestibular treatment and plans to d/c this afternoon; pt is worried to be symptomatic when returning home. Will follow up later if pt is still in house.

## 2019-02-10 NOTE — PROGRESS NOTES
Date: 2/10/2019    To: Olena Courser  : 1950    I hope this letter finds you doing well. I am writing to inform you of the following:      The biopsies obtained at the time of your recent endoscopic procedure were benign and showed no evidence of i

## 2019-03-20 PROBLEM — N18.30 CKD (CHRONIC KIDNEY DISEASE) STAGE 3, GFR 30-59 ML/MIN (HCC): Status: RESOLVED | Noted: 2018-09-04 | Resolved: 2019-03-20

## 2019-03-20 PROBLEM — N17.9 AKI (ACUTE KIDNEY INJURY) (HCC): Status: RESOLVED | Noted: 2019-01-29 | Resolved: 2019-03-20

## 2019-03-20 PROBLEM — K59.09 OTHER CONSTIPATION: Status: RESOLVED | Noted: 2017-09-08 | Resolved: 2019-03-20

## 2019-05-01 PROBLEM — N18.30 CHRONIC RENAL INSUFFICIENCY, STAGE III (MODERATE) (HCC): Status: ACTIVE | Noted: 2018-09-04

## 2019-05-01 PROBLEM — N18.30 CHRONIC RENAL INSUFFICIENCY, STAGE III (MODERATE) (HCC): Status: RESOLVED | Noted: 2018-09-04 | Resolved: 2019-05-01

## 2019-05-29 PROCEDURE — 81001 URINALYSIS AUTO W/SCOPE: CPT | Performed by: INTERNAL MEDICINE

## 2019-05-29 PROCEDURE — 87088 URINE BACTERIA CULTURE: CPT | Performed by: INTERNAL MEDICINE

## 2019-05-29 PROCEDURE — 87186 SC STD MICRODIL/AGAR DIL: CPT | Performed by: INTERNAL MEDICINE

## 2019-05-29 PROCEDURE — 87086 URINE CULTURE/COLONY COUNT: CPT | Performed by: INTERNAL MEDICINE

## 2019-06-11 PROCEDURE — 81001 URINALYSIS AUTO W/SCOPE: CPT | Performed by: INTERNAL MEDICINE

## 2019-06-11 PROCEDURE — 87086 URINE CULTURE/COLONY COUNT: CPT | Performed by: INTERNAL MEDICINE

## 2019-06-25 PROBLEM — T45.1X5A CHEMOTHERAPY-INDUCED NEUTROPENIA (HCC): Status: RESOLVED | Noted: 2019-01-03 | Resolved: 2019-06-25

## 2019-06-25 PROBLEM — D70.1 CHEMOTHERAPY-INDUCED NEUTROPENIA (HCC): Status: RESOLVED | Noted: 2019-01-03 | Resolved: 2019-06-25

## 2019-07-19 PROCEDURE — 84156 ASSAY OF PROTEIN URINE: CPT | Performed by: INTERNAL MEDICINE

## 2019-07-26 PROCEDURE — 86682 HELMINTH ANTIBODY: CPT | Performed by: INTERNAL MEDICINE

## 2019-07-26 PROCEDURE — 86256 FLUORESCENT ANTIBODY TITER: CPT | Performed by: INTERNAL MEDICINE

## 2019-07-26 PROCEDURE — 82784 ASSAY IGA/IGD/IGG/IGM EACH: CPT | Performed by: INTERNAL MEDICINE

## 2019-07-27 PROCEDURE — 83993 ASSAY FOR CALPROTECTIN FECAL: CPT | Performed by: INTERNAL MEDICINE

## 2019-08-04 PROBLEM — Z93.3 COLOSTOMY IN PLACE (HCC): Status: RESOLVED | Noted: 2019-01-03 | Resolved: 2019-08-04

## 2019-08-04 PROBLEM — D64.81 ANEMIA ASSOCIATED WITH CHEMOTHERAPY: Status: RESOLVED | Noted: 2018-07-16 | Resolved: 2019-08-04

## 2019-08-04 PROBLEM — T45.1X5A ANEMIA ASSOCIATED WITH CHEMOTHERAPY: Status: RESOLVED | Noted: 2018-07-16 | Resolved: 2019-08-04

## 2019-08-28 ENCOUNTER — ANESTHESIA EVENT (OUTPATIENT)
Dept: ENDOSCOPY | Facility: HOSPITAL | Age: 69
End: 2019-08-28
Payer: MEDICARE

## 2019-08-28 ENCOUNTER — HOSPITAL ENCOUNTER (OUTPATIENT)
Facility: HOSPITAL | Age: 69
Setting detail: HOSPITAL OUTPATIENT SURGERY
Discharge: HOME | End: 2019-08-28
Attending: INTERNAL MEDICINE | Admitting: INTERNAL MEDICINE
Payer: MEDICARE

## 2019-08-28 ENCOUNTER — ANESTHESIA (OUTPATIENT)
Dept: ENDOSCOPY | Facility: HOSPITAL | Age: 69
End: 2019-08-28
Payer: MEDICARE

## 2019-08-28 DIAGNOSIS — Z85.048 HISTORY OF RECTAL CANCER: ICD-10-CM

## 2019-08-28 DIAGNOSIS — D50.9 IRON DEFICIENCY ANEMIA, UNSPECIFIED IRON DEFICIENCY ANEMIA TYPE: ICD-10-CM

## 2019-08-28 LAB
GLUCOSE BLDC GLUCOMTR-MCNC: 134 MG/DL (ref 70–99)
GLUCOSE BLDC GLUCOMTR-MCNC: 69 MG/DL (ref 70–99)

## 2019-08-28 PROCEDURE — 0DBE8ZX EXCISION OF LARGE INTESTINE, VIA NATURAL OR ARTIFICIAL OPENING ENDOSCOPIC, DIAGNOSTIC: ICD-10-PCS | Performed by: INTERNAL MEDICINE

## 2019-08-28 PROCEDURE — 0DB98ZX EXCISION OF DUODENUM, VIA NATURAL OR ARTIFICIAL OPENING ENDOSCOPIC, DIAGNOSTIC: ICD-10-PCS | Performed by: INTERNAL MEDICINE

## 2019-08-28 PROCEDURE — 88312 SPECIAL STAINS GROUP 1: CPT | Performed by: INTERNAL MEDICINE

## 2019-08-28 PROCEDURE — 0DBB8ZX EXCISION OF ILEUM, VIA NATURAL OR ARTIFICIAL OPENING ENDOSCOPIC, DIAGNOSTIC: ICD-10-PCS | Performed by: INTERNAL MEDICINE

## 2019-08-28 PROCEDURE — 88305 TISSUE EXAM BY PATHOLOGIST: CPT | Performed by: INTERNAL MEDICINE

## 2019-08-28 PROCEDURE — 82962 GLUCOSE BLOOD TEST: CPT

## 2019-08-28 RX ORDER — NALOXONE HYDROCHLORIDE 0.4 MG/ML
80 INJECTION, SOLUTION INTRAMUSCULAR; INTRAVENOUS; SUBCUTANEOUS AS NEEDED
Status: DISCONTINUED | OUTPATIENT
Start: 2019-08-28 | End: 2019-08-28

## 2019-08-28 RX ORDER — SODIUM CHLORIDE, SODIUM LACTATE, POTASSIUM CHLORIDE, CALCIUM CHLORIDE 600; 310; 30; 20 MG/100ML; MG/100ML; MG/100ML; MG/100ML
INJECTION, SOLUTION INTRAVENOUS CONTINUOUS
Status: DISCONTINUED | OUTPATIENT
Start: 2019-08-28 | End: 2019-08-28

## 2019-08-28 RX ORDER — DEXTROSE MONOHYDRATE 25 G/50ML
50 INJECTION, SOLUTION INTRAVENOUS
Status: DISCONTINUED | OUTPATIENT
Start: 2019-08-28 | End: 2019-08-28

## 2019-08-28 RX ORDER — DEXTROSE MONOHYDRATE 25 G/50ML
25 INJECTION, SOLUTION INTRAVENOUS ONCE
Status: COMPLETED | OUTPATIENT
Start: 2019-08-28 | End: 2019-08-28

## 2019-08-28 RX ORDER — LIDOCAINE HYDROCHLORIDE 10 MG/ML
INJECTION, SOLUTION EPIDURAL; INFILTRATION; INTRACAUDAL; PERINEURAL AS NEEDED
Status: DISCONTINUED | OUTPATIENT
Start: 2019-08-28 | End: 2019-08-28 | Stop reason: SURG

## 2019-08-28 RX ADMIN — SODIUM CHLORIDE, SODIUM LACTATE, POTASSIUM CHLORIDE, CALCIUM CHLORIDE: 600; 310; 30; 20 INJECTION, SOLUTION INTRAVENOUS at 15:16:00

## 2019-08-28 RX ADMIN — SODIUM CHLORIDE, SODIUM LACTATE, POTASSIUM CHLORIDE, CALCIUM CHLORIDE: 600; 310; 30; 20 INJECTION, SOLUTION INTRAVENOUS at 15:51:00

## 2019-08-28 RX ADMIN — LIDOCAINE HYDROCHLORIDE 30 MG: 10 INJECTION, SOLUTION EPIDURAL; INFILTRATION; INTRACAUDAL; PERINEURAL at 15:19:00

## 2019-08-28 NOTE — ANESTHESIA POSTPROCEDURE EVALUATION
Patient: Bradley Ruiz    Procedure Summary     Date:  08/28/19 Room / Location:  [address] [place] [address] ENDOSCOPY 01 / [address] [place] [address] ENDOSCOPY    Anesthesia Start:  3816 Anesthesia Stop:  0780    Procedures:       COLONOSCOPY (N/A )      ESOPHAGOGASTRODUODENOSCOPY (EGD) (N/A ) Kieran Yoon

## 2019-08-28 NOTE — OPERATIVE REPORT
PATIENT NAME: Alisson Ferraro  MRN: [id number]  DATE OF OPERATION: 8/28/2019  PREOPERATIVE DIAGNOSIS:   1. Diarrhea, status post resection for rectal cancer, reversal of ileostomy. Elevated peripheral eosinophilia and elevated fecal calprotectin.   POSTOPERAT extending from 39 cm from incisors at the top of gastric folds down to 44 cm from incisors at the level diaphragmatic impression. No Carlos lesions were seen in the hernia.   2. Normal stomach throughout with no evidence of ulcers, masses or other abnorma years    MD [facility]

## 2019-08-28 NOTE — ANESTHESIA PREPROCEDURE EVALUATION
Anesthesia PreOp Note    HPI:     Drew Adams is a 81year old female who presents for preoperative consultation requested by: Katt German MD    Date of Surgery: 8/28/2019    Procedure(s):  COLONOSCOPY  ESOPHAGOGASTRODUODENOSCOPY (EGD)  Indication: antineoplastic chemotherapy    • Rectal cancer ([facility] 1/12/2018    Last chemo october 2018   • Reflux    • Type 2 diabetes mellitus with stage 3 chronic kidney disease, without long-term current use of insulin ([facility]        Past Surgical History:   Procedure Dr Lisette Guillaume at [facility]   • REMOVAL OF OVARY/TUBE(S)  2003    at same time as colectomy; at [facility]         Medications Prior to Admission:  aspirin 81 MG Oral Tab Take 81 mg by mouth daily.  Disp:  Rfl:  8/27/2019 at 0900   amLODIPine Besylate 5 MG Oral Tab 1 t Intravenous Continuous Lennox Riggers, MD Last Rate: 20 mL/hr at 08/28/19 1438     No current Epic-ordered outpatient medications on file.       Ace Inhibitors          Coughing  Bees                    ANAPHYLAXIS  Sulfa Antibiotics       FEVER    Comment status: Not on file      Intimate partner violence:        Fear of current or ex partner: Not on file        Emotionally abused: Not on file        Physically abused: Not on file        Forced sexual activity: Not on file    Other Topics      Concerns: sleep apnea  Cardiovascular - normal exam  Exercise tolerance: good  (+) hypertension,   (-) pacemaker, valvular problems/murmurs, past MI, CAD, CABG/stent, dysrhythmias, angina, CHF    Rhythm: regular  Rate: normal    Neuro/Psych - negative ROS   (-) seiz

## 2019-08-28 NOTE — H&P
[facility] [address] [place] [address] Department of  Gastroenterology  Update Health History :       Abraham Butler  female   Kate Polo MD     [id number]  6/23/1950 Primary Care Physician  Feli Hanks DO        HPI :  Diarrhea, elevated fecal calprotectin.   History Performed by Christine Hagen MD at [facility]   • [facility] 1/9/2018    Performed by Miley Mejia MD at [address] [facility] Right 6/26/2018    Performed by Jenny Wright MD at [facility] diarrhea. Disp: 60 each Rfl: 1   losartan Potassium 50 MG Oral Tab Take 1 tablet (50 mg total) by mouth daily.  Disp: 90 tablet Rfl: 3   insulin glargine (LANTUS SOLOSTAR) 100 UNIT/ML Subcutaneous Solution Pen-injector Inject 20 Units into the skin 2 (two) auscultation   CARDIO: RRR without murmur   GI: good BS's and no masses, HSM or tenderness   RECTAL: Exam not done. EXTREMITIES: no cyanosis,   NEURO: Oriented times three,  grossly intact      Assessment:  1.  Rectal cancer s/p neoadjuvant, surgical rese

## 2019-08-29 VITALS
TEMPERATURE: 98 F | SYSTOLIC BLOOD PRESSURE: 144 MMHG | BODY MASS INDEX: 27.49 KG/M2 | HEART RATE: 81 BPM | RESPIRATION RATE: 18 BRPM | DIASTOLIC BLOOD PRESSURE: 91 MMHG | WEIGHT: 165 LBS | OXYGEN SATURATION: 100 % | HEIGHT: 65 IN

## 2019-09-03 NOTE — PROGRESS NOTES
9/3/2019  [address] [facility] [address]    Dear Denver Duane,     Here are the biopsy/pathology findings from your recent EGD (upper endoscopy) and colonoscopy: The biopsy/pathology findings from your upper endoscopy showed:    1.  Duo

## 2019-09-24 PROCEDURE — 88304 TISSUE EXAM BY PATHOLOGIST: CPT | Performed by: SURGERY

## 2020-03-03 PROBLEM — D46.9 MYELODYSPLASTIC SYNDROME, UNSPECIFIED (HCC): Status: ACTIVE | Noted: 2017-05-17

## 2020-03-03 PROBLEM — N18.4 CHRONIC KIDNEY DISEASE, STAGE 4 (SEVERE) (HCC): Status: ACTIVE | Noted: 2020-03-03

## 2020-08-25 PROBLEM — Z20.822 EXPOSURE TO COVID-19 VIRUS: Status: ACTIVE | Noted: 2020-08-25

## 2020-10-27 PROBLEM — K21.9 GASTROESOPHAGEAL REFLUX DISEASE: Status: ACTIVE | Noted: 2017-09-08

## 2020-10-27 PROBLEM — Z20.822 EXPOSURE TO COVID-19 VIRUS: Status: RESOLVED | Noted: 2020-08-25 | Resolved: 2020-10-27

## 2021-09-01 PROBLEM — N18.4 CHRONIC KIDNEY DISEASE, STAGE 4 (SEVERE) (HCC): Status: RESOLVED | Noted: 2020-03-03 | Resolved: 2021-09-01

## 2021-09-01 PROBLEM — N28.9 RENAL INSUFFICIENCY: Status: RESOLVED | Noted: 2018-07-16 | Resolved: 2021-09-01

## 2021-09-01 PROBLEM — E83.42 HYPOMAGNESEMIA: Status: RESOLVED | Noted: 2018-10-17 | Resolved: 2021-09-01

## 2021-09-26 PROBLEM — F32.0 CURRENT MILD EPISODE OF MAJOR DEPRESSIVE DISORDER, UNSPECIFIED WHETHER RECURRENT (HCC): Status: ACTIVE | Noted: 2021-09-26

## 2022-02-10 PROBLEM — N18.32 STAGE 3B CHRONIC KIDNEY DISEASE (HCC): Status: ACTIVE | Noted: 2022-02-10

## 2022-03-21 NOTE — PAT NURSING NOTE
Patient currently denies any COVID symptoms. No COVID test needed. Patient instructed to call GI office if any new COVID symptoms or contacts to [place] before procedure.

## 2022-03-30 ENCOUNTER — ANESTHESIA (OUTPATIENT)
Dept: ENDOSCOPY | Facility: HOSPITAL | Age: 72
End: 2022-03-30
Payer: MEDICARE

## 2022-03-30 ENCOUNTER — ANESTHESIA EVENT (OUTPATIENT)
Dept: ENDOSCOPY | Facility: HOSPITAL | Age: 72
End: 2022-03-30
Payer: MEDICARE

## 2022-03-30 ENCOUNTER — HOSPITAL ENCOUNTER (OUTPATIENT)
Facility: HOSPITAL | Age: 72
Setting detail: HOSPITAL OUTPATIENT SURGERY
Discharge: HOME | End: 2022-03-30
Attending: INTERNAL MEDICINE | Admitting: INTERNAL MEDICINE
Payer: MEDICARE

## 2022-03-30 VITALS
RESPIRATION RATE: 18 BRPM | OXYGEN SATURATION: 98 % | BODY MASS INDEX: 28.32 KG/M2 | TEMPERATURE: 97 F | DIASTOLIC BLOOD PRESSURE: 69 MMHG | WEIGHT: 170 LBS | SYSTOLIC BLOOD PRESSURE: 139 MMHG | HEART RATE: 71 BPM | HEIGHT: 65 IN

## 2022-03-30 DIAGNOSIS — Z85.048 HISTORY OF RECTAL CANCER: ICD-10-CM

## 2022-03-30 DIAGNOSIS — R19.7 DIARRHEA, UNSPECIFIED TYPE: ICD-10-CM

## 2022-03-30 LAB — GLUCOSE BLDC GLUCOMTR-MCNC: 156 MG/DL (ref 70–99)

## 2022-03-30 PROCEDURE — 0DBE8ZX EXCISION OF LARGE INTESTINE, VIA NATURAL OR ARTIFICIAL OPENING ENDOSCOPIC, DIAGNOSTIC: ICD-10-PCS | Performed by: INTERNAL MEDICINE

## 2022-03-30 PROCEDURE — 88305 TISSUE EXAM BY PATHOLOGIST: CPT | Performed by: INTERNAL MEDICINE

## 2022-03-30 PROCEDURE — 82962 GLUCOSE BLOOD TEST: CPT

## 2022-03-30 PROCEDURE — 36415 COLL VENOUS BLD VENIPUNCTURE: CPT | Performed by: INTERNAL MEDICINE

## 2022-03-30 RX ORDER — SODIUM CHLORIDE, SODIUM LACTATE, POTASSIUM CHLORIDE, CALCIUM CHLORIDE 600; 310; 30; 20 MG/100ML; MG/100ML; MG/100ML; MG/100ML
INJECTION, SOLUTION INTRAVENOUS CONTINUOUS
Status: DISCONTINUED | OUTPATIENT
Start: 2022-03-30 | End: 2022-03-30

## 2022-03-30 RX ORDER — LIDOCAINE HYDROCHLORIDE 10 MG/ML
INJECTION, SOLUTION EPIDURAL; INFILTRATION; INTRACAUDAL; PERINEURAL AS NEEDED
Status: DISCONTINUED | OUTPATIENT
Start: 2022-03-30 | End: 2022-03-30 | Stop reason: SURG

## 2022-03-30 RX ADMIN — SODIUM CHLORIDE, SODIUM LACTATE, POTASSIUM CHLORIDE, CALCIUM CHLORIDE: 600; 310; 30; 20 INJECTION, SOLUTION INTRAVENOUS at 14:37:00

## 2022-03-30 RX ADMIN — LIDOCAINE HYDROCHLORIDE 50 MG: 10 INJECTION, SOLUTION EPIDURAL; INFILTRATION; INTRACAUDAL; PERINEURAL at 14:40:00

## 2022-03-30 NOTE — OPERATIVE REPORT
[id number]  Babak Albrecht  6/23/1950  3/30/2022      Preoperative Diagnosis: Personal history of rectal cancer status post chemotherapy and radiation followed by surgical resection. Patient is complaining of diarrhea.,  Fecal calprotectin was 343. Postoperative Diagnosis: Hemorrhoids, patent surgical anastomosis, diverticulosis, no obvious polyps  Procedure Performed: Colonoscopy to the terminal ileum with random biopsies    Anesthesia Given:     Colon  Preparation: Adequate  Colonoscopy withdrawal time: 8 minutes  Specimen: Random colon biopsies  Endoscopist:   Mary Alfaro MD  EBL: none  Complications: no immediate  Informed Consent: Informed consent for both the procedure and sedation were obtained from the patient. The potentially life-threatening complications of sedation, bleeding,  Perforation, transfusion or repeat endoscopy were reviewed along with the possible need for hospitalization, surgical management, transfusion or repeat endoscopy should one of these complications arise. The patient understands and is agreeable to proceed. Procedure:   After the patient was interviewed and the procedure again discussed and questions addressed, the patient was brought to the GI Lab and monitoring of the B/P, pulse, and pulse oximetry was performed. The patient was then placed in the left lateral decubitus position and sedated with divided doses of IV medication; continuous vital signs were monitored throughout the procedure. The Olympus video pediatric variable stiffness colonoscope was then inserted into the rectum after an unremarkable digital rectal exam. The endoscope was advanced to the cecum without difficulty; upon insertion and withdrawl the mucosa was carefully examined and the preparation was adequate. The visualized mucosal pattern was unremarkable. The distal end clear detachable cap was fitted onto the tip of the scope prior to inserting into the rectum.     Random biopsies were taken from the colon. Multiple passes were performed throughout the various segments of the colon. Sigmoid diverticulosis was seen. Given the surgery of rectal cancer were unable to retroflex in the rectum. However hemorrhoids were seen. The patient was informed of the endoscopic findings and was also given a copy of the findings, postoperative instructions, and postoperative precautions. IMPRESSION:  Findings described above    PLAN:      Check on biopsy results. Most likely colonoscopy in 3 years.   She may require a trial of TCAs for the diarrhea and her rectal pain    Maritza Garza MD

## 2022-03-31 NOTE — PROGRESS NOTES
3/31/2022  [address] [place] [id number]    Dear Mercedes Tadeo,     The  biopsy/pathology findings from your colonoscopy showed:  1. Colon biopsies unremarkable. No evidence of inflammation/colitis. Follow-up information:  1. Repeat colonoscopy in three years. 2.  Lomotil as needed for diarrhea  3. Repeat fecal calprotectin (stool test for inflammation in the intestines) in four weeks. Orders are in place for this. If you need any further assistance, please feel free to call [phone]. Thank you for letting us care for you .     Sincerely ,     Dr Daja [facility]  [phone]

## (undated) DEVICE — ENDOSCOPY PACK - LOWER: Brand: MEDLINE INDUSTRIES, INC.

## (undated) DEVICE — FILTERLINE NASAL ADULT O2/CO2

## (undated) DEVICE — FORCEP RADIAL JAW 4

## (undated) DEVICE — MEDI-VAC NON-CONDUCTIVE SUCTION TUBING 6MM X 1.8M (6FT.) L: Brand: CARDINAL HEALTH

## (undated) DEVICE — 1200CC GUARDIAN II: Brand: GUARDIAN

## (undated) DEVICE — SOL  .9 3000ML

## (undated) DEVICE — Device: Brand: DEFENDO AIR/WATER/SUCTION AND BIOPSY VALVE

## (undated) DEVICE — ENDOSCOPY PACK UPPER: Brand: MEDLINE INDUSTRIES, INC.

## (undated) DEVICE — CAP SEALING, REVEAL 13.4MM

## (undated) DEVICE — DISPOSABLE DISTAL ATTACHMENT: Brand: DISPOSABLE DISTAL ATTACHMENT

## (undated) DEVICE — KIT ENDO ORCAPOD 160/180/190

## (undated) DEVICE — KIT CLEAN ENDOKIT 1.1OZ GOWNX2

## (undated) DEVICE — CONMED SCOPE SAVER BITE BLOCK, 20X27 MM: Brand: SCOPE SAVER

## (undated) DEVICE — FORCEP BIOPSY RJ4 LG CAP W/ND

## (undated) DEVICE — 3M™ RED DOT™ MONITORING ELECTRODE WITH FOAM TAPE AND STICKY GEL, 50/BAG, 20/CASE, 72/PLT 2570: Brand: RED DOT™

## (undated) DEVICE — Device: Brand: CUSTOM PROCEDURE KIT

## (undated) DEVICE — ZIPWIRE GUIDEWIRE .038X150 STR

## (undated) DEVICE — CYSTO CDS-LF: Brand: MEDLINE INDUSTRIES, INC.

## (undated) DEVICE — GAMMEX® PI HYBRID SIZE 7.5, STERILE POWDER-FREE SURGICAL GLOVE, POLYISOPRENE AND NEOPRENE BLEND: Brand: GAMMEX

## (undated) DEVICE — LINE MNTR ADLT SET O2 INTMD

## (undated) DEVICE — KENDALL SCD EXPRESS SLEEVES, KNEE LENGTH, MEDIUM: Brand: KENDALL SCD

## (undated) NOTE — LETTER
Belia Horton [address] [address]  [place], [address] [facility]    Consent for Operation  Date: __________________                                Time: _______________    1.  I authorize the performance upon Porsha Albrecht the following operation:  Procedure(s procedure has been videotaped, the surgeon will obtain the original videotape. The hospital will not be responsible for storage or maintenance of this tape.   8. For the purpose of advancing medical education, I consent to the admittance of observers to the STATEMENTS REQUIRING INSERTION OR COMPLETION WERE FILLED IN.     Signature of Patient:   ___________________________    When the patient is a minor or mentally incompetent to give consent:  Signature of person authorized to consent for patient: ____________ drugs/illegal medications). Failure to inform my anesthesiologist about these medicines may increase my risk of anesthetic complications. iv. If I am allergic to anything or have had a reaction to anesthesia before.   3. I understand how the anesthesia med I have discussed the procedure and information above with the patient (or patient’s representative) and answered their questions. The patient or their representative has agreed to have anesthesia services.     _______________________________________________

## (undated) NOTE — LETTER
[facility]  Administration of Anesthesia  1. Bairon MACIAS, or _________________________________ acting on her behalf, (Patient) (Dependent/Representative) request to receive anesthesia for my pending procedure/operation/treatment. A physician (anesthesiologist) alone or an anesthesiologist working with a nurse anesthetist may administer my anesthesia. 2. I understand that my anesthesiologist is not an employee or agent of the hospital, but is an independent medical practitioner who has been permitted to use its facilities for the care and treatment of his/her patients. 3. I acknowledge that a physician from [facility] or their designate(s), recommended anesthesia for me using her/his medical judgment. The type(s) of anesthesia I may receive include:                a) General Anesthesia, b) Spinal/Epidural Anesthesia, c) Regional Anesthesia or d) Monitored Anesthesia Care. 4. If my spinal, regional or monitored anesthesia care (local) is not satisfactory for my comfort, or if my medical condition requires, I consent to the administration of general anesthesia. 5. I am aware that the practice of anesthesiology is not an exact science and that some foreseeable risks or consequences may occur. Some common risks/consequences include sore throat and hoarseness, nausea and vomiting, muscle soreness, backache, damage to the mouth/teeth/vocal cords and eye injury. I understand that more rare but serious potential risks of anesthesia include blood pressure changes, drug reactions, cardiac arrest, brain damage, paralysis or death. These risks apply to whether I have general, spinal/epidural, regional or monitored anesthesia care. 6. OBSTETRIC PATIENTS: Specific risks/consequences of spinal/epidural anesthesia may include itching, low blood pressure, difficulty urinating, slowing of the baby's heart rate and headache.  Rare risks include infections, high spinal block, spinal bleeding, seizure, cardiac arrest and death. 7. AWARENESS: I understand that it is possible (but unlikely) to have explicit memory of events from the operating room while under general anesthesia. 8. ELECTROCONVULSIVE THERAPY PATIENTS: This consent serves for all treatments in a single course of therapy. 9. I understand that I must inform my anesthesiologist when I last ate and/or drank to minimize the risk of anesthesia. 10. If I am pregnant, or may pregnant, I understand that elective surgery should be postponed until after the baby is born. Anesthetics cross the placenta and may temporarily anesthetize the baby. Although fetal complications of anesthesia during pregnancy are rare, they may include birth defects, premature labor, brain damage and death. 11. I certify that I informed the anesthesiologist, to the best of my ability, about medication I take including blood thinners, anticoagulants, herbal remedies, narcotics and recreational drugs (e.g. cocaine, marijuana, PCP). Failure to inform my anesthesiologist about these medicines may increase my risk of anesthetic complications. The nature and purpose of my anesthetic management was explained to me. I had the opportunity to ask questions and the answers and information provided meet my satisfaction.   I retain the right to withdraw this consent at any time prior to the administration of said anesthetic.    ___________________________________________________           _____________________________________________________  Patient Signature                                                                                      Witness Signature                ___________________________________________________           _____________________________________________________  Date/Time                                                                                               Responsible person in case of minor/ unconscious pt /Relationship    My signature below affirms that prior to the time of the procedure, I have explained to the patient and/or his/her guardian, the risks and benefits of undergoing anesthesia, as well as any reasonable alternatives.     ___________________________________________________            _____________________________________________________  Physician Signature                            Date/Time  Patient Name: Elda Rodriguez     : 1950     Printed: 3/28/2022      Medical Record #: [id number]                              Page 1 of 1    ----------ANESTHESIA CONSENT----------

## (undated) NOTE — LETTER
[facility]  Gabriela Arronstephen [address] [facility], [address] [place] [address]    Consent for Operation    Date: __________________    Time: _______________    1. I authorize the performance upon Porsha Albrecht the following operation:    Procedure(s):    FLEXIBLE SIGMOIDOSCOP procedure has been videotaped, the surgeon will obtain the original videotape. The hospital will not be responsible for storage or maintenance of this tape.     6. For the purpose of advancing medical education, I consent to the admittance of observers to t STATEMENTS REQUIRING INSERTION OR COMPLETION WERE FILLED IN.     Signature of Patient:   ___________________________    When the patient is a minor or mentally incompetent to give consent:  Signature of person authorized to consent for patient: ____________ drugs/illegal medications). Failure to inform my anesthesiologist about these medicines may increase my risk of anesthetic complications. · If I am allergic to anything or have had a reaction to anesthesia before.     3. I understand how the anesthesia med I have discussed the procedure and information above with the patient (or patient’s representative) and answered their questions. The patient or their representative has agreed to have anesthesia services.     _______________________________________________

## (undated) NOTE — LETTER
[address] [place] [address]      Authorization for Surgical Operation and Procedure     Date:___________                                                                                                         Time:__________  1. I hereby Jeri Harding MD, my physician and his/her assistants (if applicable), which may include medical students, residents, and/or fellows, to perform the following surgical operation/ procedure and administer such anesthesia as may be determined necessary by my physician:  Operation/Procedure name (s) Colonoscopy  on Porsha Albrecht   2. I recognize that during the surgical operation/procedure, unforeseen conditions may necessitate additional or different procedures than those listed above. I, therefore, further authorize and request that the above-named surgeon, assistants, or designees perform such procedures as are, in their judgment, necessary and desirable. 3.   My surgeon/physician has discussed prior to my surgery the potential benefits, risks and side effects of this procedure; the likelihood of achieving goals; and potential problems that might occur during recuperation. They also discussed reasonable alternatives to the procedure, including risks, benefits, and side effects related to the alternatives and risks related to not receiving this procedure. I have had all my questions answered and I acknowledge that no guarantee has been made as to the result that may be obtained. 4.   Should the need arise during my operation or immediate post-operative period, I also consent to the administration of blood and/or blood products. Further, I understand that despite careful testing and screening of blood or blood products by collecting agencies, I may still be subject to ill effects as a result of receiving a blood transfusion and/or blood products.   The following are some, but not all, of the potential risks that can occur: fever and allergic reactions, hemolytic reactions, transmission of diseases such as Hepatitis, AIDS and Cytomegalovirus (CMV) and fluid overload. In the event that I wish to have an autologous transfusion of my own blood, or a directed donor transfusion. I will discuss this with my physician. 5.   I authorize the use of any specimen, organs, tissues, body parts or foreign objects that may be removed from my body during the operation/procedure for diagnosis, research or teaching purposes and their subsequent disposal by hospital authorities. I also authorize the release of specimen test results and/or written reports to my treating physician on the hospital medical staff or other referring or consulting physicians involved in my care, at the discretion of the Pathologist or my treating physician. 6.   I consent to the photographing or videotaping of the operations or procedures to be performed, including appropriate portions of my body for medical, scientific, or educational purposes, provided my identity is not revealed by the pictures or by descriptive texts accompanying them. If the procedure has been photographed/videotaped, the surgeon will obtain the original picture, image, videotape or CD. The hospital will not be responsible for storage, release or maintenance of the picture, image, tape or CD.    7.   I consent to the presence of a  or observers in the operating room as deemed necessary by my physician or their designees. 8.   I recognize that in the event my procedure results in extended X-Ray/fluoroscopy time, I may develop a skin reaction. 9. If I have a Do Not Attempt Resuscitation (DNAR) order in place, that status will be suspended while in the operating room, procedural suite, and during the recovery period unless otherwise explicitly stated by me (or a person authorized to consent on my behalf).  The surgeon or my attending physician will determine when the applicable recovery period ends for purposes of reinstating the DNAR order. 10. Patients having a sterilization procedure: I understand that if the procedure is successful the results will be permanent and it will therefore be impossible for me to inseminate, conceive, or bear children. I also understand that the procedure is intended to result in sterility, although the result has not been guaranteed. 11. I acknowledge that my physician has explained sedation/analgesia administration to me including the risk and benefits I consent to the administration of sedation/analgesia as may be necessary or desirable in the judgment of my physician. I CERTIFY THAT I HAVE READ AND FULLY UNDERSTAND THE ABOVE CONSENT TO OPERATION and/or OTHER PROCEDURE.    _________________________________________  __________________________________  Signature of Patient     Signature of Responsible Person         ___________________________________         Printed Name of Responsible Person           _________________________________                  Relationship to Patient  _________________________________________  ______________________________  Signature of Witness          Date  Time    STATEMENT OF PHYSICIAN My signature below affirms that prior to the time of the procedure; I have explained to the patient and/or his/her legal representative, the risks and benefits involved in the proposed treatment and any reasonable alternative to the proposed treatment. I have also explained the risks and benefits involved in refusal of the proposed treatment and alternatives to the proposed treatment and have answered the patient's questions. If I have a significant financial interest in a co-management agreement or a significant financial interest in any product or implant, or other significant relationship used in this procedure/surgery, I have disclosed this and had a discussion with my patient. _______________________________________________________________ _____________________________  Debbie Ania of Physician)                                                                                         (Date)                                   (Time)        Patient Name: Lisa Alexandre    : 1950   Printed: 3/28/2022      Medical Record #: [id number]                                              Page 1 of 1

## (undated) NOTE — LETTER
Consent to Procedure/Sedation    Date: 2/5/2019    Time: _______________    1. I authorize the performance upon Porsha Albrecht the following:    Right Nephrostomy Tube Insertion    2.  I authorize Dr. Cole Smith (and whomever is designated as the [facility] Witness: ____________________     Date: ______________    Printed: 2019   1:18 PM    Patient Name: Drew Admas        : 1950       Medical Record #: [id number]

## (undated) NOTE — LETTER
[address] [place] [address]  Margy, [address] [facility]    Consent for Operation  Date: _______1/30/2019___________                                Time: _________1400______    1.  I authorize the performance upon Porsha Albrecht the following operation: revealed by the pictures or by descriptive texts accompanying them. If the procedure has been videotaped, the surgeon will obtain the original videotape. The hospital will not be responsible for storage or maintenance of this tape.   7. For the purpose of a THAT MY DOCTOR PROVIDED ME WITH THE ABOVE EXPLANATIONS, THAT ALL BLANKS OR STATEMENTS REQUIRING INSERTION OR COMPLETION WERE FILLED IN.     Signature of Patient:   ___________________________    When the patient is a minor or mentally incompetent to give co iii. All of the medicines I take (including prescriptions, herbal supplements, and pills I can buy without a prescription (including street drugs/illegal medications).  Failure to inform my anesthesiologist about these medicines may increase my risk of anes _____________________________________________________________________________  Anesthesiologist Signature     Date   Time  I have discussed the procedure and information above with the patient (or patient’s representative) and answered their questions.  The